# Patient Record
Sex: MALE | Race: WHITE | HISPANIC OR LATINO | Employment: FULL TIME | URBAN - METROPOLITAN AREA
[De-identification: names, ages, dates, MRNs, and addresses within clinical notes are randomized per-mention and may not be internally consistent; named-entity substitution may affect disease eponyms.]

---

## 2020-09-05 ENCOUNTER — APPOINTMENT (EMERGENCY)
Dept: CT IMAGING | Facility: HOSPITAL | Age: 60
End: 2020-09-05
Payer: COMMERCIAL

## 2020-09-05 ENCOUNTER — HOSPITAL ENCOUNTER (EMERGENCY)
Facility: HOSPITAL | Age: 60
Discharge: HOME | End: 2020-09-05
Attending: EMERGENCY MEDICINE | Admitting: EMERGENCY MEDICINE
Payer: COMMERCIAL

## 2020-09-05 VITALS
BODY MASS INDEX: 27.98 KG/M2 | RESPIRATION RATE: 18 BRPM | WEIGHT: 218 LBS | TEMPERATURE: 99 F | SYSTOLIC BLOOD PRESSURE: 122 MMHG | HEIGHT: 74 IN | DIASTOLIC BLOOD PRESSURE: 77 MMHG | OXYGEN SATURATION: 96 % | HEART RATE: 83 BPM

## 2020-09-05 DIAGNOSIS — N20.0 KIDNEY STONE: Primary | ICD-10-CM

## 2020-09-05 LAB
ALBUMIN SERPL BCP-MCNC: 4.5 G/DL (ref 3.4–4.8)
ALP SERPL-CCNC: 85.7 U/L (ref 10–129)
ALT SERPL W P-5'-P-CCNC: 27 U/L (ref 5–63)
ANION GAP SERPL CALCULATED.3IONS-SCNC: 13 MMOL/L (ref 4–13)
AST SERPL W P-5'-P-CCNC: 29 U/L (ref 15–41)
BACTERIA UR QL AUTO: ABNORMAL /HPF
BASOPHILS # BLD AUTO: 0.03 THOUSANDS/ΜL (ref 0–0.1)
BASOPHILS NFR BLD AUTO: 0 % (ref 0–1)
BILIRUB SERPL-MCNC: 0.81 MG/DL (ref 0.3–1.2)
BILIRUB UR QL STRIP: NEGATIVE
BUN SERPL-MCNC: 26 MG/DL (ref 6–20)
CALCIUM SERPL-MCNC: 9.8 MG/DL (ref 8.4–10.2)
CHLORIDE SERPL-SCNC: 102 MMOL/L (ref 96–108)
CLARITY UR: ABNORMAL
CO2 SERPL-SCNC: 21 MMOL/L (ref 22–33)
COLOR UR: YELLOW
CREAT SERPL-MCNC: 1.35 MG/DL (ref 0.5–1.2)
EOSINOPHIL # BLD AUTO: 0.04 THOUSAND/ΜL (ref 0–0.61)
EOSINOPHIL NFR BLD AUTO: 0 % (ref 0–6)
ERYTHROCYTE [DISTWIDTH] IN BLOOD BY AUTOMATED COUNT: 12.6 % (ref 11.6–15.1)
GFR SERPL CREATININE-BSD FRML MDRD: 57 ML/MIN/1.73SQ M
GLUCOSE SERPL-MCNC: 157 MG/DL (ref 65–140)
GLUCOSE UR STRIP-MCNC: ABNORMAL MG/DL
HCT VFR BLD AUTO: 49.2 % (ref 36.5–49.3)
HGB BLD-MCNC: 16.9 G/DL (ref 12–17)
HGB UR QL STRIP.AUTO: ABNORMAL
IMM GRANULOCYTES # BLD AUTO: 0.04 THOUSAND/UL (ref 0–0.2)
IMM GRANULOCYTES NFR BLD AUTO: 0 % (ref 0–2)
KETONES UR STRIP-MCNC: ABNORMAL MG/DL
LACTATE SERPL-SCNC: 1.3 MMOL/L (ref 0–2)
LEUKOCYTE ESTERASE UR QL STRIP: NEGATIVE
LYMPHOCYTES # BLD AUTO: 0.97 THOUSANDS/ΜL (ref 0.6–4.47)
LYMPHOCYTES NFR BLD AUTO: 8 % (ref 14–44)
MCH RBC QN AUTO: 29.9 PG (ref 26.8–34.3)
MCHC RBC AUTO-ENTMCNC: 34.3 G/DL (ref 31.4–37.4)
MCV RBC AUTO: 87 FL (ref 82–98)
MONOCYTES # BLD AUTO: 0.75 THOUSAND/ΜL (ref 0.17–1.22)
MONOCYTES NFR BLD AUTO: 7 % (ref 4–12)
NEUTROPHILS # BLD AUTO: 9.75 THOUSANDS/ΜL (ref 1.85–7.62)
NEUTS SEG NFR BLD AUTO: 85 % (ref 43–75)
NITRITE UR QL STRIP: NEGATIVE
NON-SQ EPI CELLS URNS QL MICRO: ABNORMAL /HPF
PH UR STRIP.AUTO: 5.5 [PH]
PLATELET # BLD AUTO: 164 THOUSANDS/UL (ref 149–390)
PMV BLD AUTO: 9.8 FL (ref 8.9–12.7)
POTASSIUM SERPL-SCNC: 4.1 MMOL/L (ref 3.5–5)
PROT SERPL-MCNC: 7.4 G/DL (ref 6.4–8.3)
PROT UR STRIP-MCNC: ABNORMAL MG/DL
RBC # BLD AUTO: 5.65 MILLION/UL (ref 3.88–5.62)
RBC #/AREA URNS AUTO: ABNORMAL /HPF
SODIUM SERPL-SCNC: 136 MMOL/L (ref 133–145)
SP GR UR STRIP.AUTO: 1.02 (ref 1–1.03)
UROBILINOGEN UR QL STRIP.AUTO: 0.2 E.U./DL
WBC # BLD AUTO: 11.58 THOUSAND/UL (ref 4.31–10.16)
WBC #/AREA URNS AUTO: ABNORMAL /HPF

## 2020-09-05 PROCEDURE — 85025 COMPLETE CBC W/AUTO DIFF WBC: CPT | Performed by: PHYSICIAN ASSISTANT

## 2020-09-05 PROCEDURE — 81001 URINALYSIS AUTO W/SCOPE: CPT | Performed by: EMERGENCY MEDICINE

## 2020-09-05 PROCEDURE — 83605 ASSAY OF LACTIC ACID: CPT | Performed by: PHYSICIAN ASSISTANT

## 2020-09-05 PROCEDURE — 80053 COMPREHEN METABOLIC PANEL: CPT | Performed by: PHYSICIAN ASSISTANT

## 2020-09-05 PROCEDURE — 99284 EMERGENCY DEPT VISIT MOD MDM: CPT

## 2020-09-05 PROCEDURE — 36415 COLL VENOUS BLD VENIPUNCTURE: CPT | Performed by: PHYSICIAN ASSISTANT

## 2020-09-05 PROCEDURE — 74176 CT ABD & PELVIS W/O CONTRAST: CPT

## 2020-09-05 PROCEDURE — G1004 CDSM NDSC: HCPCS

## 2020-09-05 PROCEDURE — 99285 EMERGENCY DEPT VISIT HI MDM: CPT | Performed by: PHYSICIAN ASSISTANT

## 2020-09-05 PROCEDURE — 96374 THER/PROPH/DIAG INJ IV PUSH: CPT

## 2020-09-05 RX ORDER — ALLOPURINOL 300 MG/1
TABLET ORAL
COMMUNITY
End: 2022-08-01 | Stop reason: SDUPTHER

## 2020-09-05 RX ORDER — ASPIRIN 81 MG/1
TABLET, CHEWABLE ORAL DAILY
COMMUNITY

## 2020-09-05 RX ORDER — EMPAGLIFLOZIN 25 MG/1
TABLET, FILM COATED ORAL
COMMUNITY

## 2020-09-05 RX ORDER — ATORVASTATIN CALCIUM 10 MG/1
TABLET, FILM COATED ORAL
COMMUNITY

## 2020-09-05 RX ORDER — LOSARTAN POTASSIUM 25 MG/1
TABLET ORAL
COMMUNITY

## 2020-09-05 RX ORDER — KETOROLAC TROMETHAMINE 30 MG/ML
15 INJECTION, SOLUTION INTRAMUSCULAR; INTRAVENOUS ONCE
Status: COMPLETED | OUTPATIENT
Start: 2020-09-05 | End: 2020-09-05

## 2020-09-05 RX ADMIN — KETOROLAC TROMETHAMINE 15 MG: 30 INJECTION, SOLUTION INTRAMUSCULAR at 21:05

## 2020-09-06 NOTE — ED PROVIDER NOTES
History  Chief Complaint   Patient presents with    Flank Pain     lower right flank sudden pain since 4pm, denies nausea and vomiting, pain comes and goes, denies dysuria, urgency or painful urinations  pt with hx of kidney stones     61-year-old male with history of prior kidney stones, hypertension, hyperlipidemia, doubt who drinks a lot of tea comes in today complaining of sudden onset right sided abdominal pain that began around 1600 has been intermittent since then  Reports that prior to arrival the pain became very severe  Denies any blood in his urine, back pain, fevers, nausea, vomiting, diarrhea          Prior to Admission Medications   Prescriptions Last Dose Informant Patient Reported? Taking? Cholecalciferol (Vitamin D3) 1 25 MG (29857 UT) TABS   Yes No   Sig: Daily   Empagliflozin (Jardiance) 25 MG TABS   Yes No   Sig: Jardiance 25 mg tablet   take 1 tablet by mouth once daily   allopurinol (ZYLOPRIM) 300 mg tablet   Yes No   Sig: allopurinol 300 mg tablet   take 1 tablet by mouth once daily   aspirin 81 mg chewable tablet   Yes No   Sig: Daily   atorvastatin (LIPITOR) 10 mg tablet   Yes No   Sig: atorvastatin 10 mg tablet   take 1 tablet by mouth at bedtime   losartan (COZAAR) 25 mg tablet   Yes No   Sig: losartan 25 mg tablet   take 1 tablet by mouth once daily   metFORMIN (GLUCOPHAGE) 1000 MG tablet   Yes No   Sig: metformin 1,000 mg tablet   take 1 tablet by mouth twice a day for diabetes      Facility-Administered Medications: None       Past Medical History:   Diagnosis Date    Pericarditis        Past Surgical History:   Procedure Laterality Date    RETINAL DETACHMENT SURGERY Left        History reviewed  No pertinent family history  I have reviewed and agree with the history as documented      E-Cigarette/Vaping    E-Cigarette Use Never User      E-Cigarette/Vaping Substances     Social History     Tobacco Use    Smoking status: Never Smoker    Smokeless tobacco: Never Used Substance Use Topics    Alcohol use: Not Currently    Drug use: Never       Review of Systems   Constitutional: Negative for fever  HENT: Negative for facial swelling  Eyes: Negative for redness  Respiratory: Negative for shortness of breath  Cardiovascular: Negative for chest pain  Gastrointestinal: Positive for abdominal pain  Negative for diarrhea, nausea and vomiting  Genitourinary: Negative for dysuria and hematuria  Musculoskeletal: Negative for back pain  Skin: Negative for rash  Neurological: Negative for headaches  Psychiatric/Behavioral: Negative for confusion  Physical Exam  Physical Exam  Constitutional:       General: He is in acute distress (Moderate distress)  Appearance: He is well-developed  HENT:      Head: Normocephalic and atraumatic  Eyes:      Conjunctiva/sclera: Conjunctivae normal    Neck:      Musculoskeletal: Normal range of motion  Cardiovascular:      Rate and Rhythm: Normal rate and regular rhythm  Pulmonary:      Effort: Pulmonary effort is normal       Breath sounds: Normal breath sounds  No wheezing  Abdominal:      General: Abdomen is flat  Bowel sounds are normal       Palpations: Abdomen is soft  Musculoskeletal: Normal range of motion  Skin:     General: Skin is warm and dry  Neurological:      Mental Status: He is alert and oriented to person, place, and time     Psychiatric:         Behavior: Behavior normal          Vital Signs  ED Triage Vitals [09/05/20 2025]   Temperature Pulse Respirations Blood Pressure SpO2   99 °F (37 2 °C) 89 20 140/87 99 %      Temp Source Heart Rate Source Patient Position - Orthostatic VS BP Location FiO2 (%)   Tympanic Monitor Lying Right arm --      Pain Score       Worst Possible Pain           Vitals:    09/05/20 2025   BP: 140/87   Pulse: 89   Patient Position - Orthostatic VS: Lying         Visual Acuity      ED Medications  Medications   ketorolac (TORADOL) injection 15 mg (has no administration in time range)       Diagnostic Studies  Results Reviewed     Procedure Component Value Units Date/Time    CBC and differential [[id number]]  (Abnormal) Collected:  09/05/20 2042    Lab Status:  Final result Specimen:  Blood from Arm, Left Updated:  09/05/20 2049     WBC 11 58 Thousand/uL      RBC 5 65 Million/uL      Hemoglobin 16 9 g/dL      Hematocrit 49 2 %      MCV 87 fL      MCH 29 9 pg      MCHC 34 3 g/dL      RDW 12 6 %      MPV 9 8 fL      Platelets 955 Thousands/uL      Neutrophils Relative 85 %      Immat GRANS % 0 %      Lymphocytes Relative 8 %      Monocytes Relative 7 %      Eosinophils Relative 0 %      Basophils Relative 0 %      Neutrophils Absolute 9 75 Thousands/µL      Immature Grans Absolute 0 04 Thousand/uL      Lymphocytes Absolute 0 97 Thousands/µL      Monocytes Absolute 0 75 Thousand/µL      Eosinophils Absolute 0 04 Thousand/µL      Basophils Absolute 0 03 Thousands/µL     Lactic acid [[id number]] Collected:  09/05/20 2042    Lab Status: In process Specimen:  Blood from Arm, Left Updated:  09/05/20 2046    Comprehensive metabolic panel [[id number]] Collected:  09/05/20 2041    Lab Status: In process Specimen:  Blood from Arm, Left Updated:  09/05/20 2046                 CT renal stone study abdomen pelvis without contrast    (Results Pending)              Procedures  Procedures         ED Course  ED Course as of Sep 05 2051   Sat Sep 05, 2020   2047 Patient care transitioned to Dr James Pino pending labs and CT          US AUDIT      Most Recent Value   Initial Alcohol Screen: US AUDIT-C    1  How often do you have a drink containing alcohol?  0 Filed at: 09/05/2020 2042   2  How many drinks containing alcohol do you have on a typical day you are drinking? 0 Filed at: 09/05/2020 2042   3a  Male UNDER 65: How often do you have five or more drinks on one occasion?   0 Filed at: 09/05/2020 2042   Audit-C Score  0 Filed at: 09/05/2020 2042                  VANESSA/DAST-10      Most Recent Value   How many times in the past year have you    Used an illegal drug or used a prescription medication for non-medical reasons? Never Filed at: 09/05/2020 2042                                MDM      Disposition  Final diagnoses:   None     ED Disposition     None      Follow-up Information    None         Patient's Medications   Discharge Prescriptions    No medications on file     No discharge procedures on file      PDMP Review     None          ED Provider  Electronically Signed by           Jeremias Rubio PA-C  09/05/20 2051

## 2021-03-10 DIAGNOSIS — Z23 ENCOUNTER FOR IMMUNIZATION: ICD-10-CM

## 2021-03-28 ENCOUNTER — IMMUNIZATIONS (OUTPATIENT)
Dept: FAMILY MEDICINE CLINIC | Facility: HOSPITAL | Age: 61
End: 2021-03-28

## 2021-03-28 DIAGNOSIS — Z23 ENCOUNTER FOR IMMUNIZATION: Primary | ICD-10-CM

## 2021-03-28 PROCEDURE — 0001A SARS-COV-2 / COVID-19 MRNA VACCINE (PFIZER-BIONTECH) 30 MCG: CPT

## 2021-03-28 PROCEDURE — 91300 SARS-COV-2 / COVID-19 MRNA VACCINE (PFIZER-BIONTECH) 30 MCG: CPT

## 2021-04-18 ENCOUNTER — IMMUNIZATIONS (OUTPATIENT)
Dept: FAMILY MEDICINE CLINIC | Facility: HOSPITAL | Age: 61
End: 2021-04-18

## 2021-04-18 DIAGNOSIS — Z23 ENCOUNTER FOR IMMUNIZATION: Primary | ICD-10-CM

## 2021-04-18 PROCEDURE — 0002A SARS-COV-2 / COVID-19 MRNA VACCINE (PFIZER-BIONTECH) 30 MCG: CPT

## 2021-04-18 PROCEDURE — 91300 SARS-COV-2 / COVID-19 MRNA VACCINE (PFIZER-BIONTECH) 30 MCG: CPT

## 2021-10-15 ENCOUNTER — HOSPITAL ENCOUNTER (OUTPATIENT)
Dept: ULTRASOUND IMAGING | Facility: HOSPITAL | Age: 61
Discharge: HOME | End: 2021-10-15
Attending: SPECIALIST
Payer: COMMERCIAL

## 2021-10-15 ENCOUNTER — HOSPITAL ENCOUNTER (OUTPATIENT)
Dept: RADIOLOGY | Facility: HOSPITAL | Age: 61
Discharge: HOME | End: 2021-10-15
Attending: SPECIALIST
Payer: COMMERCIAL

## 2021-10-15 DIAGNOSIS — N20.0 URIC ACID NEPHROLITHIASIS: ICD-10-CM

## 2021-10-15 DIAGNOSIS — N20.0 CALCULUS, RENAL: ICD-10-CM

## 2021-10-15 PROCEDURE — 74018 RADEX ABDOMEN 1 VIEW: CPT

## 2021-10-15 PROCEDURE — 76770 US EXAM ABDO BACK WALL COMP: CPT

## 2022-08-01 DIAGNOSIS — Z87.39 HISTORY OF GOUT: Primary | ICD-10-CM

## 2022-08-01 DIAGNOSIS — E11.9 TYPE 2 DIABETES MELLITUS WITHOUT COMPLICATION, WITHOUT LONG-TERM CURRENT USE OF INSULIN (HCC): ICD-10-CM

## 2022-08-02 RX ORDER — ALLOPURINOL 300 MG/1
300 TABLET ORAL DAILY
Qty: 90 TABLET | Refills: 0 | Status: SHIPPED | OUTPATIENT
Start: 2022-08-02

## 2022-08-16 ENCOUNTER — TELEPHONE (OUTPATIENT)
Dept: FAMILY MEDICINE CLINIC | Facility: CLINIC | Age: 62
End: 2022-08-16

## 2022-08-16 NOTE — TELEPHONE ENCOUNTER
Estrella Munoz [id number]  Rebellious, rebellious rebels is 14 milligrams  Phone number [phone]            Hernandez 14mg 1 POQD    #30 w/2    [employer]

## 2022-08-17 DIAGNOSIS — E11.9 TYPE 2 DIABETES MELLITUS WITHOUT COMPLICATION, WITHOUT LONG-TERM CURRENT USE OF INSULIN (HCC): ICD-10-CM

## 2022-08-17 DIAGNOSIS — E11.9 TYPE 2 DIABETES MELLITUS WITHOUT COMPLICATION, WITHOUT LONG-TERM CURRENT USE OF INSULIN (HCC): Primary | ICD-10-CM

## 2022-08-17 RX ORDER — ORAL SEMAGLUTIDE 14 MG/1
1 TABLET ORAL DAILY
Qty: 30 TABLET | Refills: 2 | Status: SHIPPED | OUTPATIENT
Start: 2022-08-17

## 2022-08-17 RX ORDER — ORAL SEMAGLUTIDE 14 MG/1
1 TABLET ORAL DAILY
Qty: 30 TABLET | Refills: 2 | Status: SHIPPED | OUTPATIENT
Start: 2022-08-17 | End: 2022-08-17 | Stop reason: SDUPTHER

## 2022-08-17 RX ORDER — ORAL SEMAGLUTIDE 14 MG/1
1 TABLET ORAL DAILY
COMMUNITY
Start: 2022-07-16 | End: 2022-08-17 | Stop reason: SDUPTHER

## 2022-09-23 LAB — HBA1C MFR BLD HPLC: 7.2 %

## 2022-09-29 ENCOUNTER — OFFICE VISIT (OUTPATIENT)
Dept: FAMILY MEDICINE CLINIC | Facility: CLINIC | Age: 62
End: 2022-09-29
Payer: COMMERCIAL

## 2022-09-29 VITALS
WEIGHT: 211 LBS | TEMPERATURE: 98.2 F | DIASTOLIC BLOOD PRESSURE: 70 MMHG | BODY MASS INDEX: 27.08 KG/M2 | SYSTOLIC BLOOD PRESSURE: 144 MMHG | HEIGHT: 74 IN | HEART RATE: 85 BPM | OXYGEN SATURATION: 97 %

## 2022-09-29 DIAGNOSIS — E11.9 TYPE 2 DIABETES MELLITUS WITHOUT COMPLICATION, WITHOUT LONG-TERM CURRENT USE OF INSULIN (HCC): Primary | ICD-10-CM

## 2022-09-29 DIAGNOSIS — E78.2 MIXED HYPERLIPIDEMIA: ICD-10-CM

## 2022-09-29 DIAGNOSIS — I10 HTN (HYPERTENSION), BENIGN: ICD-10-CM

## 2022-09-29 DIAGNOSIS — Z87.39 HISTORY OF GOUT: ICD-10-CM

## 2022-09-29 PROBLEM — N20.9 UROLITHIASIS: Status: ACTIVE | Noted: 2022-09-29

## 2022-09-29 PROCEDURE — 4010F ACE/ARB THERAPY RXD/TAKEN: CPT

## 2022-09-29 PROCEDURE — 99214 OFFICE O/P EST MOD 30 MIN: CPT

## 2022-09-29 RX ORDER — LOSARTAN POTASSIUM 25 MG/1
25 TABLET ORAL DAILY
Qty: 30 TABLET | Refills: 3 | Status: SHIPPED | OUTPATIENT
Start: 2022-09-29

## 2022-09-29 RX ORDER — ROSUVASTATIN CALCIUM 10 MG/1
10 TABLET, COATED ORAL DAILY
COMMUNITY
Start: 2022-08-28 | End: 2022-09-29 | Stop reason: SDUPTHER

## 2022-09-29 RX ORDER — ROSUVASTATIN CALCIUM 10 MG/1
10 TABLET, COATED ORAL DAILY
Qty: 30 TABLET | Refills: 3 | Status: SHIPPED | OUTPATIENT
Start: 2022-09-29

## 2022-09-29 NOTE — PROGRESS NOTES
Assessment/Plan:         Problem List Items Addressed This Visit        Endocrine    Type 2 diabetes mellitus without complication, without long-term current use of insulin ([address] ) - Primary     Under control  Continue current medication  We will re-evaluate at next office visit  No results found for: HGBA1C            Cardiovascular and Mediastinum    HTN (hypertension), benign     Under control  Continue current medication  We will re-evaluate at next office visit  Other Visit Diagnoses     BMI 27 0-27 9,adult                Subjective:      Patient ID: Dary Orellana is a 58 y o  male  Patient here for review of chronic medical problems and review of the labs and imaging if it is applicable  Currently has no specific complaints other than mentioned in the review of systems  Denies chest pain, SOB, cough, abdominal pain, nausea, vomiting, fever, chills, lightheadedness, dizziness,headache, tingling or numbness  No bowel or bladder problem  The following portions of the patient's history were reviewed and updated as appropriate:   Past Medical History:  He has a past medical history of Allergic rhinitis, Carotid stenosis, Colon polyps, Degenerative joint disease, Diabetes mellitus ([facility] [address] ), Diverticulitis, Glaucoma, Gout, Hyperlipidemia, Kidney stones, Obesity, and Pericarditis  ,  _______________________________________________________________________  Medical Problems:  does not have any pertinent problems on file ,  _______________________________________________________________________  Past Surgical History:   has a past surgical history that includes Retinal detachment surgery (Left); Colonoscopy; Cardiac surgery; and Cataract extraction (Bilateral)  ,  _______________________________________________________________________  Family History:  family history includes Arthritis in his mother; Coronary artery disease in his brother; Diabetes in his mother ,  _______________________________________________________________________  Social History:   reports that he has never smoked  He has never used smokeless tobacco  He reports previous alcohol use  He reports that he does not use drugs  ,  _______________________________________________________________________  Allergies:  has No Known Allergies     _______________________________________________________________________  Current Outpatient Medications   Medication Sig Dispense Refill    allopurinol (ZYLOPRIM) 300 mg tablet Take 1 tablet (300 mg total) by mouth daily 90 tablet 0    aspirin 81 mg chewable tablet Daily      Cholecalciferol (Vitamin D3) 1 25 MG (39872 UT) TABS Daily      Empagliflozin (Jardiance) 25 MG TABS Jardiance 25 mg tablet   take 1 tablet by mouth once daily      losartan (COZAAR) 25 mg tablet losartan 25 mg tablet   take 1 tablet by mouth once daily      metFORMIN (GLUCOPHAGE) 1000 MG tablet Take 1 tablet (1,000 mg total) by mouth 2 (two) times a day with meals 180 tablet 0    rosuvastatin (CRESTOR) 10 MG tablet Take 10 mg by mouth daily      Rybelsus 14 MG TABS Take 1 tablet by mouth daily 30 tablet 2    atorvastatin (LIPITOR) 10 mg tablet atorvastatin 10 mg tablet   take 1 tablet by mouth at bedtime (Patient not taking: Reported on 9/29/2022)       No current facility-administered medications for this visit      _______________________________________________________________________  Review of Systems   Constitutional: Negative for chills, fatigue and fever  HENT: Negative for congestion, ear pain, rhinorrhea, sneezing and sore throat  Eyes: Positive for visual disturbance  Negative for pain, discharge, redness and itching  Respiratory: Negative for cough, chest tightness and shortness of breath  Cardiovascular: Negative for chest pain, palpitations and leg swelling  Gastrointestinal: Negative for abdominal pain, blood in stool, diarrhea, nausea and vomiting  Endocrine: Negative for cold intolerance, heat intolerance, polydipsia, polyphagia and polyuria  Genitourinary: Negative for dysuria, frequency, hematuria and urgency  Musculoskeletal: Negative for arthralgias, back pain and myalgias  Skin: Negative for color change and rash  Neurological: Negative for dizziness, weakness, light-headedness, numbness and headaches  Hematological: Does not bruise/bleed easily  Psychiatric/Behavioral: Negative for agitation, behavioral problems and confusion  Objective:  Vitals:    09/29/22 1401   BP: 144/70   BP Location: Left arm   Patient Position: Sitting   Pulse: 85   Temp: 98 2 °F (36 8 °C)   TempSrc: Tympanic   SpO2: 97%   Weight: 95 7 kg (211 lb)   Height: 6' 2" (1 88 m)     Body mass index is 27 09 kg/m²  Physical Exam  Vitals and nursing note reviewed  Constitutional:       General: He is not in acute distress  Appearance: Normal appearance  He is not ill-appearing, toxic-appearing or diaphoretic  HENT:      Head: Normocephalic and atraumatic  Right Ear: Tympanic membrane normal       Left Ear: Tympanic membrane normal       Nose: Nose normal  No congestion  Mouth/Throat:      Mouth: Mucous membranes are moist    Eyes:      General: No scleral icterus  Right eye: No discharge  Left eye: No discharge  Extraocular Movements: Extraocular movements intact  Conjunctiva/sclera: Conjunctivae normal       Pupils: Pupils are equal, round, and reactive to light  Cardiovascular:      Rate and Rhythm: Normal rate and regular rhythm  Pulses: Normal pulses  no weak pulses          Dorsalis pedis pulses are 2+ on the right side and 2+ on the left side  Heart sounds: Normal heart sounds  No murmur heard  No gallop  Pulmonary:      Effort: Pulmonary effort is normal  No respiratory distress  Breath sounds: Normal breath sounds  No wheezing, rhonchi or rales  Abdominal:      General: Abdomen is flat  Bowel sounds are normal  There is no distension  Palpations: Abdomen is soft  Tenderness: There is no abdominal tenderness  There is no guarding  Musculoskeletal:         General: No swelling or tenderness  Normal range of motion  Cervical back: Normal range of motion and neck supple  No rigidity  Feet:      Right foot:      Skin integrity: Callus and dry skin present  No ulcer, skin breakdown, erythema or warmth  Left foot:      Skin integrity: Callus and dry skin present  No ulcer, skin breakdown, erythema or warmth  Lymphadenopathy:      Cervical: No cervical adenopathy  Skin:     General: Skin is warm  Capillary Refill: Capillary refill takes 2 to 3 seconds  Coloration: Skin is not jaundiced  Findings: No bruising or rash  Neurological:      General: No focal deficit present  Mental Status: He is alert and oriented to person, place, and time  Mental status is at baseline  Gait: Gait normal    Psychiatric:         Mood and Affect: Mood normal        BMI Counseling: Body mass index is 27 09 kg/m²  The BMI is above normal  Nutrition recommendations include decreasing portion sizes, decreasing fast food intake, limiting drinks that contain sugar, moderation in carbohydrate intake, increasing intake of lean protein and reducing intake of saturated and trans fat  Exercise recommendations include vigorous physical activity 75 minutes/week  No pharmacotherapy was ordered  Rationale for BMI follow-up plan is due to patient being overweight or obese  Diabetic Foot Exam    Patient's shoes and socks removed  Right Foot/Ankle   Right Foot Inspection  Skin Exam: skin normal, skin intact, dry skin, callus and callus  No warmth, no erythema, no maceration, no abnormal color, no pre-ulcer and no ulcer  Toe Exam: ROM and strength within normal limits   No swelling and erythema    Sensory   Monofilament testing: intact    Vascular  Capillary refills: < 3 seconds  The right DP pulse is 2+  Left Foot/Ankle  Left Foot Inspection  Skin Exam: skin normal, skin intact, dry skin and callus  No warmth, no erythema, no maceration, normal color, no pre-ulcer and no ulcer  Toe Exam: ROM and strength within normal limits  No swelling and no erythema  Sensory   Monofilament testing: intact    Vascular  Capillary refills: < 3 seconds  The left DP pulse is 2+       Assign Risk Category  No deformity present  No loss of protective sensation  No weak pulses  Risk: 0

## 2022-09-29 NOTE — ASSESSMENT & PLAN NOTE
Keron Ms. Blankenship Yanira,  Your results came back and urine analysis showed some ketones which means you were fasting and need to eat.  There is trace leukocyte esterase but the nitrites were negative which does suggests you do not have a urine infection. Microscope evaluation showed there were no red blood cells or white cells which also goes along with likely not having a urine infection.  You did have some bacteria attached to your skin cells and mucus that fell into the urine likely from collection contamination.  I did set up a urine culture to make sure you are not growing any bacteria.  We will make further recommendations once those results are in   For now if feeling well and have not started the antibiotic would avoid doing so as it does not seem necessary.   If you have any further concerns please do not hesitate to contact us by message, phone or making an appointment.  Have a good day   Dr Brandon WEINSTEIN  Under control  Continue current medication  We will re-evaluate at next office visit

## 2022-10-03 ENCOUNTER — TELEPHONE (OUTPATIENT)
Dept: FAMILY MEDICINE CLINIC | Facility: CLINIC | Age: 62
End: 2022-10-03

## 2022-11-05 ENCOUNTER — APPOINTMENT (OUTPATIENT)
Dept: LAB | Facility: MEDICAL CENTER | Age: 62
End: 2022-11-05

## 2022-11-05 DIAGNOSIS — R35.1 NOCTURIA: ICD-10-CM

## 2022-11-05 LAB — PSA SERPL-MCNC: 0.8 NG/ML (ref 0–4)

## 2022-11-09 DIAGNOSIS — E11.9 TYPE 2 DIABETES MELLITUS WITHOUT COMPLICATION, WITHOUT LONG-TERM CURRENT USE OF INSULIN (HCC): ICD-10-CM

## 2022-11-09 DIAGNOSIS — Z87.39 HISTORY OF GOUT: ICD-10-CM

## 2022-11-09 RX ORDER — ALLOPURINOL 300 MG/1
300 TABLET ORAL DAILY
Qty: 90 TABLET | Refills: 0 | Status: SHIPPED | OUTPATIENT
Start: 2022-11-09

## 2022-11-09 NOTE — TELEPHONE ENCOUNTER
Pharmacy fax for :        Allopurinol 300mg 1 poqd #90    Metformin 1,000mg 1 poBID #180        Sent scripts to [facility]

## 2022-11-10 DIAGNOSIS — R07.9 CHEST PAIN, UNSPECIFIED TYPE: Primary | ICD-10-CM

## 2022-12-15 DIAGNOSIS — E11.9 TYPE 2 DIABETES MELLITUS WITHOUT COMPLICATION, WITHOUT LONG-TERM CURRENT USE OF INSULIN (HCC): ICD-10-CM

## 2022-12-15 RX ORDER — ORAL SEMAGLUTIDE 14 MG/1
1 TABLET ORAL DAILY
Qty: 30 TABLET | Refills: 2 | Status: SHIPPED | OUTPATIENT
Start: 2022-12-15

## 2023-01-23 DIAGNOSIS — Z87.39 HISTORY OF GOUT: ICD-10-CM

## 2023-01-23 DIAGNOSIS — E11.9 TYPE 2 DIABETES MELLITUS WITHOUT COMPLICATION, WITHOUT LONG-TERM CURRENT USE OF INSULIN (HCC): ICD-10-CM

## 2023-01-23 DIAGNOSIS — I10 HTN (HYPERTENSION), BENIGN: ICD-10-CM

## 2023-01-23 DIAGNOSIS — E78.2 MIXED HYPERLIPIDEMIA: ICD-10-CM

## 2023-01-23 RX ORDER — LOSARTAN POTASSIUM 25 MG/1
25 TABLET ORAL DAILY
Qty: 30 TABLET | Refills: 3 | Status: SHIPPED | OUTPATIENT
Start: 2023-01-23

## 2023-01-23 RX ORDER — ALLOPURINOL 300 MG/1
300 TABLET ORAL DAILY
Qty: 90 TABLET | Refills: 0 | Status: SHIPPED | OUTPATIENT
Start: 2023-01-23

## 2023-01-23 RX ORDER — ROSUVASTATIN CALCIUM 10 MG/1
10 TABLET, COATED ORAL DAILY
Qty: 30 TABLET | Refills: 3 | Status: SHIPPED | OUTPATIENT
Start: 2023-01-23

## 2023-01-26 ENCOUNTER — CONSULT (OUTPATIENT)
Dept: CARDIOLOGY CLINIC | Facility: CLINIC | Age: 63
End: 2023-01-26

## 2023-01-26 VITALS
WEIGHT: 210 LBS | HEIGHT: 74 IN | DIASTOLIC BLOOD PRESSURE: 82 MMHG | TEMPERATURE: 99 F | OXYGEN SATURATION: 97 % | SYSTOLIC BLOOD PRESSURE: 116 MMHG | HEART RATE: 104 BPM | BODY MASS INDEX: 26.95 KG/M2

## 2023-01-26 DIAGNOSIS — R07.9 CHEST PAIN, UNSPECIFIED TYPE: ICD-10-CM

## 2023-01-26 DIAGNOSIS — Z82.49 FAMILY HISTORY OF EARLY CAD: Primary | ICD-10-CM

## 2023-01-26 DIAGNOSIS — Z98.890 HISTORY OF PERICARDIECTOMY: ICD-10-CM

## 2023-01-26 DIAGNOSIS — N18.2 TYPE 2 DIABETES MELLITUS WITH STAGE 2 CHRONIC KIDNEY DISEASE, WITHOUT LONG-TERM CURRENT USE OF INSULIN (HCC): ICD-10-CM

## 2023-01-26 DIAGNOSIS — E11.22 TYPE 2 DIABETES MELLITUS WITH STAGE 2 CHRONIC KIDNEY DISEASE, WITHOUT LONG-TERM CURRENT USE OF INSULIN (HCC): ICD-10-CM

## 2023-01-26 DIAGNOSIS — N18.2 STAGE 2 CHRONIC KIDNEY DISEASE: ICD-10-CM

## 2023-01-26 NOTE — PROGRESS NOTES
[address] [facility] [address] [address]  Phone#  [phone]  Fax#  [phone]                                               Cardiology Office Consult  Aishwarya Sharma, 58 y o  male  YOB: 1960  MRN: [id number] Encounter: [id number]      PCP - Walter Tse MD  Referring Provider - Fantasma Riley MD    Chief Complaint   Patient presents with   • Consult       Assessment  Family history of early CAD  Brother was otherwise healthy, and suddenly needed CABG at 62  Father  suddenly of presumed heart attack  Diabetes Mellitus Type 2  A1c 7 2% (22)  CKD-2  Cr 1 35 in   Hyperlipidemia  s/p pericardiectomy (@[facility])  For infected pericardium    Plan  Family history of early CAD  Overview  His father  suddenly of a presumed heart attack, and his brother recently needed CABG after otherwise seemingly being healthy and as a result this is got his wife very concerned  No major chest pain/pressure shortness of breath  ECG today: sinus tachycardia, possible LAD enlargement, poor R-wave progression - cannot r/o old anterior infarct  Impression  He does have multiple risk factors related to diabetes, family history of early CAD, but otherwise seems to be doing well  Plan  Recommended risk stratification with exercise ECG stress test  ECG has possibilities of old anterior infarct, and he reports having had a prior cardiac surgery to remove his entire pericardium -> check echocardiogram  Optimize risk factors  Avoid smoking, alcohol  Optimize diabetes control  Counseled regarding symptoms that need to be monitored for      Results for orders placed or performed in visit on 23   POCT ECG    Impression    Sinus tachycardia  Possible left atrial enlargement  Poor R wave progression = cannot rule out old anterior infarct       Orders Placed This Encounter   Procedures   • Lipid Panel with Direct LDL reflex   • Basic metabolic panel   • Stress test only, exercise   • POCT ECG   • Echo complete w/ contrast if indicated     Return in about 3 months (around 2023), or if symptoms worsen or fail to improve  History of Present Illness   58 y o  male , who works as  at [employer], comes in as a new patient for consultation regarding cardiac risk stratification due to family history of early CAD  He reports no active or recent symptoms of chest pain or shortness of breath  No complaints of palpitations, dizziness or lightheadedness, near-syncope or syncope  No prior history of CAD or heart failure  He remains quite active with working and reports being able to go up 2 flights of stairs without any major symptoms  Recently his brother was also quite healthy suddenly needed bypass surgery, and this is got his wife very concerned  Additionally his father also has a history of dying suddenly in his 62s, and as a result he is here for cardiac risk stratification    He does report to me that he had a cardiac surgery at the age of 5, which involved stripping the entire covering around the heart due to an infection in that area  This was reportedly done at [facility] around [address] [facility]  He had limited follow-up after that, but has not seen a cardiologist in many decades  Family history  Father -  in sleep at 72, of presumed heart attack  Mother - went for pre-op for knee replacement, found to have cardiomyopathy and very low EF    Paternal uncle -  at age 28 of "massive heart attack"  Brother - CABG at age 62    Historical Information   Past Medical History:   Diagnosis Date   • Allergic rhinitis    • Carotid stenosis    • Colon polyps    • Degenerative joint disease    • Diabetes mellitus ([address] )    • Diverticulitis    • Glaucoma    • Gout    • Hyperlipidemia    • Kidney stones    • Obesity    • Pericarditis      Past Surgical History:   Procedure Laterality Date   • CARDIAC SURGERY     • CATARACT EXTRACTION Bilateral     Left eye 4/30/2021 and right eye 5/3/2021   • COLONOSCOPY     • RETINAL DETACHMENT SURGERY Left      Family History   Problem Relation Age of Onset   • Diabetes Mother    • Arthritis Mother         Rheumatoid   • Coronary artery disease Brother      Current Outpatient Medications on File Prior to Visit   Medication Sig Dispense Refill   • allopurinol (ZYLOPRIM) 300 mg tablet Take 1 tablet (300 mg total) by mouth daily 90 tablet 0   • aspirin 81 mg chewable tablet Daily     • Cholecalciferol (Vitamin D3) 1 25 MG (26040 UT) TABS Daily     • Empagliflozin (Jardiance) 25 MG TABS Take 1 tablet (25 mg total) by mouth daily after breakfast 30 tablet 3   • losartan (COZAAR) 25 mg tablet Take 1 tablet (25 mg total) by mouth daily 30 tablet 3   • metFORMIN (GLUCOPHAGE) 1000 MG tablet Take 1 tablet (1,000 mg total) by mouth 2 (two) times a day with meals 180 tablet 0   • rosuvastatin (CRESTOR) 10 MG tablet Take 1 tablet (10 mg total) by mouth daily 30 tablet 3   • Rybelsus 14 MG TABS Take 1 tablet by mouth daily 30 tablet 2     No current facility-administered medications on file prior to visit       No Known Allergies  Social History     Socioeconomic History   • Marital status: /Civil Union     Spouse name: None   • Number of children: None   • Years of education: None   • Highest education level: None   Occupational History   • None   Tobacco Use   • Smoking status: Never   • Smokeless tobacco: Never   Vaping Use   • Vaping Use: Never used   Substance and Sexual Activity   • Alcohol use: Not Currently   • Drug use: Never   • Sexual activity: None   Other Topics Concern   • None   Social History Narrative   • None     Social Determinants of Health     Financial Resource Strain: Not on file   Food Insecurity: Not on file   Transportation Needs: Not on file   Physical Activity: Not on file   Stress: Not on file   Social Connections: Not on file   Intimate Partner Violence: Not on file Housing Stability: Not on file        Review of Systems   All other systems reviewed and are negative  Vitals:  Vitals:    01/26/23 1304   BP: 116/82   BP Location: Right arm   Patient Position: Sitting   Cuff Size: Standard   Pulse: 104   Temp: 99 °F (37 2 °C)   SpO2: 97%   Weight: 95 3 kg (210 lb)   Height: 6' 2" (1 88 m)     BMI - Body mass index is 26 96 kg/m²  Wt Readings from Last 7 Encounters:   01/26/23 95 3 kg (210 lb)   09/29/22 95 7 kg (211 lb)   09/05/20 98 9 kg (218 lb)       Physical Exam  Vitals and nursing note reviewed  Constitutional:       General: He is not in acute distress  Appearance: He is well-developed  He is not ill-appearing or diaphoretic  HENT:      Head: Normocephalic and atraumatic  Nose: No congestion  Eyes:      General: No scleral icterus  Conjunctiva/sclera: Conjunctivae normal    Neck:      Vascular: No carotid bruit or JVD  Cardiovascular:      Rate and Rhythm: Normal rate and regular rhythm  Heart sounds: Normal heart sounds  No murmur heard  No friction rub  No gallop  Pulmonary:      Effort: Pulmonary effort is normal  No respiratory distress  Breath sounds: Normal breath sounds  No wheezing or rales  Chest:      Chest wall: No tenderness  Abdominal:      General: There is no distension  Palpations: Abdomen is soft  Tenderness: There is no abdominal tenderness  Musculoskeletal:         General: No swelling, tenderness or deformity  Cervical back: Neck supple  No muscular tenderness  Right lower leg: No edema  Left lower leg: No edema  Skin:     General: Skin is warm  Neurological:      General: No focal deficit present  Mental Status: He is alert and oriented to person, place, and time  Mental status is at baseline  Psychiatric:         Mood and Affect: Mood normal          Behavior: Behavior normal          Thought Content:  Thought content normal            Labs:  CBC:   Lab Results Component Value Date    WBC 11 58 (H) 09/05/2020    RBC 5 65 (H) 09/05/2020    HGB 16 9 09/05/2020    HCT 49 2 09/05/2020    MCV 87 09/05/2020     09/05/2020    RDW 12 6 09/05/2020       CMP:   Lab Results   Component Value Date    K 4 1 09/05/2020     09/05/2020    CO2 21 (L) 09/05/2020    BUN 26 (H) 09/05/2020    CREATININE 1 35 (H) 09/05/2020    EGFR 57 09/05/2020    CALCIUM 9 8 09/05/2020    AST 29 09/05/2020    ALT 27 09/05/2020    ALKPHOS 85 7 09/05/2020       Magnesium:  No results found for: MG    Lipid Profile:   No results found for: CHOL, HDL, TRIG, LDLCALC    Thyroid Studies: No results found for: KJT6ZRJVBOOR, T3FREE, FREET4, R1SDBBB, Y4VWLQQ    A1c:  No components found for: HGA1C    INR:  No results found for: ZGX7    Imaging: No results found  Cardiac testing:   No results found for this or any previous visit  No results found for this or any previous visit  No results found for this or any previous visit  No results found for this or any previous visit  XR abdomen 1 view kub  Narrative: Clinical history: Renal calculus  TECHNIQUE: AP supine images of the abdomen and pelvis were obtained  FINDINGS: There is a large amount of formed stool throughout the entire colon  No evidence of bowel obstruction  Lung bases are clear  Skeletal structures show mild degenerative disc disease  Impression: 1  Large amount of stool throughout the colon  No evidence of bowel obstruction  Workstation performed: [id number]  US kidney and bladder  Narrative: RENAL ULTRASOUND    INDICATION:   N20 0: Calculus of kidney  COMPARISON: Prior studies, most recent is a CT scan dated September 5, 2020  TECHNIQUE:   Ultrasound of the retroperitoneum was performed with a curvilinear transducer utilizing volumetric sweeps and still imaging techniques  FINDINGS:    KIDNEYS:  Symmetric and normal size  Right kidney:  12 9 cm  Left kidney:  12 4 cm      Right kidney  Normal echogenicity and contour  No suspicious masses detected  No hydronephrosis  No shadowing calculi  No perinephric fluid collections  Left kidney  Normal echogenicity and contour  No suspicious masses detected  No hydronephrosis  No shadowing calculi  No perinephric fluid collections  URETERS:  Nonvisualized  BLADDER:   Normally distended  Probable mild bladder wall thickening  Bilateral ureteral jets detected  Cursory images of the liver show hepatic steatosis  Impression: 1  Negative for echogenic calculi  No hydronephrosis  2   Probable mild bladder wall thickening likely on the basis of chronic outlet obstruction  3   Hepatic steatosis incompletely imaged       Workstation performed: [id number]

## 2023-02-08 ENCOUNTER — APPOINTMENT (OUTPATIENT)
Dept: LAB | Facility: MEDICAL CENTER | Age: 63
End: 2023-02-08

## 2023-02-08 DIAGNOSIS — E11.22 TYPE 2 DIABETES MELLITUS WITH STAGE 2 CHRONIC KIDNEY DISEASE, WITHOUT LONG-TERM CURRENT USE OF INSULIN (HCC): ICD-10-CM

## 2023-02-08 DIAGNOSIS — N18.2 TYPE 2 DIABETES MELLITUS WITH STAGE 2 CHRONIC KIDNEY DISEASE, WITHOUT LONG-TERM CURRENT USE OF INSULIN (HCC): ICD-10-CM

## 2023-02-08 DIAGNOSIS — E11.9 TYPE 2 DIABETES MELLITUS WITHOUT COMPLICATION, WITHOUT LONG-TERM CURRENT USE OF INSULIN (HCC): ICD-10-CM

## 2023-02-08 DIAGNOSIS — Z82.49 FAMILY HISTORY OF EARLY CAD: ICD-10-CM

## 2023-02-08 DIAGNOSIS — N18.2 STAGE 2 CHRONIC KIDNEY DISEASE: ICD-10-CM

## 2023-02-08 LAB
ANION GAP SERPL CALCULATED.3IONS-SCNC: 5 MMOL/L (ref 4–13)
BUN SERPL-MCNC: 27 MG/DL (ref 5–25)
CALCIUM SERPL-MCNC: 9.1 MG/DL (ref 8.3–10.1)
CHLORIDE SERPL-SCNC: 107 MMOL/L (ref 96–108)
CHOLEST SERPL-MCNC: 144 MG/DL
CO2 SERPL-SCNC: 25 MMOL/L (ref 21–32)
CREAT SERPL-MCNC: 1.17 MG/DL (ref 0.6–1.3)
GFR SERPL CREATININE-BSD FRML MDRD: 66 ML/MIN/1.73SQ M
GLUCOSE P FAST SERPL-MCNC: 151 MG/DL (ref 65–99)
HDLC SERPL-MCNC: 30 MG/DL
LDLC SERPL DIRECT ASSAY-MCNC: 72 MG/DL (ref 0–100)
POTASSIUM SERPL-SCNC: 3.9 MMOL/L (ref 3.5–5.3)
SODIUM SERPL-SCNC: 137 MMOL/L (ref 135–147)
TRIGL SERPL-MCNC: 579 MG/DL

## 2023-02-09 LAB
EST. AVERAGE GLUCOSE BLD GHB EST-MCNC: 206 MG/DL
HBA1C MFR BLD: 8.8 %

## 2023-02-10 ENCOUNTER — HOSPITAL ENCOUNTER (OUTPATIENT)
Dept: NON INVASIVE DIAGNOSTICS | Facility: HOSPITAL | Age: 63
Discharge: HOME | End: 2023-02-10
Attending: INTERNAL MEDICINE

## 2023-02-10 VITALS
SYSTOLIC BLOOD PRESSURE: 116 MMHG | DIASTOLIC BLOOD PRESSURE: 82 MMHG | BODY MASS INDEX: 26.95 KG/M2 | HEIGHT: 74 IN | WEIGHT: 210 LBS | HEART RATE: 104 BPM

## 2023-02-10 DIAGNOSIS — Z82.49 FAMILY HISTORY OF EARLY CAD: ICD-10-CM

## 2023-02-10 DIAGNOSIS — R07.9 CHEST PAIN, UNSPECIFIED TYPE: Primary | ICD-10-CM

## 2023-02-10 DIAGNOSIS — Z98.890 HISTORY OF PERICARDIECTOMY: ICD-10-CM

## 2023-02-10 DIAGNOSIS — R07.9 CHEST PAIN, UNSPECIFIED TYPE: ICD-10-CM

## 2023-02-10 DIAGNOSIS — I42.9 CARDIOMYOPATHY, UNSPECIFIED TYPE (HCC): ICD-10-CM

## 2023-02-10 LAB
AORTIC ROOT: 3.6 CM
APICAL FOUR CHAMBER EJECTION FRACTION: 49 %
ASCENDING AORTA: 4 CM
E WAVE DECELERATION TIME: 194 MS
FRACTIONAL SHORTENING: 23 (ref 28–44)
GLOBAL LONGITUIDAL STRAIN: -12 %
INTERVENTRICULAR SEPTUM IN DIASTOLE (PARASTERNAL SHORT AXIS VIEW): 1.2 CM
INTERVENTRICULAR SEPTUM: 1.2 CM (ref 0.6–1.1)
LAAS-AP4: 17.9 CM2
LEFT ATRIUM SIZE: 4.4 CM
LEFT INTERNAL DIMENSION IN SYSTOLE: 3.7 CM (ref 2.1–4)
LEFT VENTRICLE DIASTOLIC VOLUME (MOD BIPLANE): 71 ML
LEFT VENTRICLE SYSTOLIC VOLUME (MOD BIPLANE): 41 ML
LEFT VENTRICULAR INTERNAL DIMENSION IN DIASTOLE: 4.8 CM (ref 3.5–6)
LEFT VENTRICULAR POSTERIOR WALL IN END DIASTOLE: 1.3 CM
LEFT VENTRICULAR STROKE VOLUME: 51 ML
LV EF: 43 %
LVSV (TEICH): 51 ML
MV E'TISSUE VEL-SEP: 9 CM/S
MV PEAK A VEL: 0.67 M/S
MV PEAK E VEL: 46 CM/S
MV STENOSIS PRESSURE HALF TIME: 56 MS
MV VALVE AREA P 1/2 METHOD: 3.93
RIGHT ATRIAL 2D VOLUME: 31 ML
RIGHT ATRIUM AREA SYSTOLE A4C: 14.4 CM2
RIGHT VENTRICLE ID DIMENSION: 3.8 CM
SINOTUBULAR JUNCTION: 3.4 CM
SL CV LV EF: 49
SL CV PED ECHO LEFT VENTRICLE DIASTOLIC VOLUME (MOD BIPLANE) 2D: 108 ML
SL CV PED ECHO LEFT VENTRICLE SYSTOLIC VOLUME (MOD BIPLANE) 2D: 57 ML
STJ: 3.4 CM

## 2023-02-10 NOTE — NURSING NOTE
After review of echo images, Dr Brigid Rausch ordered nuclear stress test, and to cancel treadmill stress

## 2023-02-10 NOTE — PROGRESS NOTES
Patient came for echo and stress test today, but echo itself showed wall motion abnormalities with hypokinesis of basal septal wall as well as basal to mid inferior wall  With this new finding of regional wall motion abnormalities, and his prior reported symptoms of chest discomfort, I recommend switching the stress test to an exercise nuclear stress to clarify ischemia v/s infarct, and better assess EF  Order for exercise nuclear stress test is placed, and will be rescheduled for the same

## 2023-02-14 ENCOUNTER — OFFICE VISIT (OUTPATIENT)
Dept: FAMILY MEDICINE CLINIC | Facility: CLINIC | Age: 63
End: 2023-02-14

## 2023-02-14 VITALS
DIASTOLIC BLOOD PRESSURE: 64 MMHG | TEMPERATURE: 98 F | HEART RATE: 81 BPM | SYSTOLIC BLOOD PRESSURE: 118 MMHG | RESPIRATION RATE: 16 BRPM | OXYGEN SATURATION: 98 % | WEIGHT: 213.8 LBS | HEIGHT: 74 IN | BODY MASS INDEX: 27.44 KG/M2

## 2023-02-14 DIAGNOSIS — E11.9 TYPE 2 DIABETES MELLITUS WITHOUT COMPLICATION, WITHOUT LONG-TERM CURRENT USE OF INSULIN (HCC): Primary | ICD-10-CM

## 2023-02-14 DIAGNOSIS — E78.2 MIXED HYPERLIPIDEMIA: ICD-10-CM

## 2023-02-14 DIAGNOSIS — Z87.39 HISTORY OF GOUT: ICD-10-CM

## 2023-02-14 DIAGNOSIS — I10 HTN (HYPERTENSION), BENIGN: ICD-10-CM

## 2023-02-14 NOTE — PROGRESS NOTES
Assessment/Plan:         Problem List Items Addressed This Visit        Endocrine    Type 2 diabetes mellitus without complication, without long-term current use of insulin ([address] ) - Primary     Under reasonable control    Continue current medication  We will re-evaluate at next office visit  Lab Results   Component Value Date    HGBA1C 8 8 (H) 02/08/2023            Relevant Orders    Microalbumin / creatinine urine ratio    Hemoglobin A1C       Cardiovascular and Mediastinum    HTN (hypertension), benign     Under control  Continue current medication  We will re-evaluate at next office visit  Other    Mixed hyperlipidemia     Under control  Continue current medication  We will re-evaluate at next office visit  History of gout     Under control  Continue current medication  We will re-evaluate at next office visit  Subjective:      Patient ID: Madi Wilson is a 58 y o  male  Patient here for review of chronic medical problems and review of the labs and imaging if it is applicable  Currently has no specific complaints other than mentioned in the review of systems  Denies chest pain, SOB, cough, abdominal pain, nausea, vomiting, fever, chills, lightheadedness, dizziness,headache, tingling or numbness  No bowel or bladder problem  The following portions of the patient's history were reviewed and updated as appropriate:   Past Medical History:  He has a past medical history of Allergic rhinitis, Carotid stenosis, Colon polyps, Degenerative joint disease, Diabetes mellitus ([address] ), Diverticulitis, Glaucoma, Gout, Hyperlipidemia, Kidney stones, Obesity, and Pericarditis  ,  _______________________________________________________________________  Medical Problems:  does not have any pertinent problems on file ,  _______________________________________________________________________  Past Surgical History:   has a past surgical history that includes Retinal detachment surgery (Left); Colonoscopy; Cardiac surgery; and Cataract extraction (Bilateral)  ,  _______________________________________________________________________  Family History:  family history includes Arthritis in his mother; Coronary artery disease in his brother; Diabetes in his mother ,  _______________________________________________________________________  Social History:   reports that he has never smoked  He has been exposed to tobacco smoke  He has never used smokeless tobacco  He reports that he does not currently use alcohol  He reports that he does not use drugs  ,  _______________________________________________________________________  Allergies:  has No Known Allergies     _______________________________________________________________________  Current Outpatient Medications   Medication Sig Dispense Refill   • allopurinol (ZYLOPRIM) 300 mg tablet Take 1 tablet (300 mg total) by mouth daily 90 tablet 0   • aspirin 81 mg chewable tablet Daily     • Cholecalciferol (Vitamin D3) 1 25 MG (20268 UT) TABS Daily     • Empagliflozin (Jardiance) 25 MG TABS Take 1 tablet (25 mg total) by mouth daily after breakfast 30 tablet 3   • losartan (COZAAR) 25 mg tablet Take 1 tablet (25 mg total) by mouth daily 30 tablet 3   • metFORMIN (GLUCOPHAGE) 1000 MG tablet Take 1 tablet (1,000 mg total) by mouth 2 (two) times a day with meals 180 tablet 0   • rosuvastatin (CRESTOR) 10 MG tablet Take 1 tablet (10 mg total) by mouth daily 30 tablet 3   • Rybelsus 14 MG TABS Take 1 tablet by mouth daily 30 tablet 2     No current facility-administered medications for this visit      _______________________________________________________________________  Review of Systems   Constitutional: Negative for chills, fatigue and fever  HENT: Negative for congestion, ear pain, rhinorrhea, sneezing and sore throat  Eyes: Positive for visual disturbance  Negative for pain, discharge, redness and itching     Respiratory: Negative for cough, chest tightness and shortness of breath  Cardiovascular: Negative for chest pain, palpitations and leg swelling  Gastrointestinal: Negative for abdominal pain, blood in stool, diarrhea, nausea and vomiting  Endocrine: Negative for cold intolerance, heat intolerance, polydipsia, polyphagia and polyuria  Genitourinary: Negative for dysuria, frequency, hematuria and urgency  Musculoskeletal: Negative for arthralgias, back pain and myalgias  Skin: Negative for color change and rash  Neurological: Negative for dizziness, weakness, light-headedness, numbness and headaches  Hematological: Does not bruise/bleed easily  Psychiatric/Behavioral: Negative for agitation, behavioral problems and confusion  Objective:  Vitals:    02/14/23 1432   BP: 118/64   BP Location: Left arm   Patient Position: Sitting   Cuff Size: Standard   Pulse: 81   Resp: 16   Temp: 98 °F (36 7 °C)   TempSrc: Tympanic   SpO2: 98%   Weight: 97 kg (213 lb 12 8 oz)   Height: 6' 2" (1 88 m)     Body mass index is 27 45 kg/m²  Physical Exam  Vitals and nursing note reviewed  Constitutional:       General: He is not in acute distress  Appearance: Normal appearance  He is not ill-appearing, toxic-appearing or diaphoretic  HENT:      Head: Normocephalic and atraumatic  Right Ear: Tympanic membrane and ear canal normal       Left Ear: Tympanic membrane and ear canal normal       Nose: Nose normal  No congestion  Mouth/Throat:      Mouth: Mucous membranes are moist    Eyes:      General: No scleral icterus  Right eye: No discharge  Left eye: No discharge  Extraocular Movements: Extraocular movements intact  Conjunctiva/sclera: Conjunctivae normal       Pupils: Pupils are equal, round, and reactive to light  Cardiovascular:      Rate and Rhythm: Normal rate and regular rhythm  Pulses: Normal pulses  Heart sounds: Normal heart sounds  No murmur heard      No gallop  Pulmonary:      Effort: Pulmonary effort is normal  No respiratory distress  Breath sounds: Normal breath sounds  No wheezing, rhonchi or rales  Abdominal:      General: Abdomen is flat  Bowel sounds are normal  There is no distension  Palpations: Abdomen is soft  Tenderness: There is no abdominal tenderness  There is no right CVA tenderness, left CVA tenderness or guarding  Musculoskeletal:         General: No swelling or tenderness  Normal range of motion  Cervical back: Normal range of motion and neck supple  No rigidity  Right lower leg: No edema  Left lower leg: No edema  Lymphadenopathy:      Cervical: No cervical adenopathy  Skin:     General: Skin is warm  Capillary Refill: Capillary refill takes 2 to 3 seconds  Coloration: Skin is not jaundiced  Findings: No bruising or rash  Neurological:      General: No focal deficit present  Mental Status: He is alert and oriented to person, place, and time  Mental status is at baseline  Motor: No weakness        Gait: Gait normal    Psychiatric:         Mood and Affect: Mood normal          Behavior: Behavior normal

## 2023-02-14 NOTE — ASSESSMENT & PLAN NOTE
Under reasonable control    Continue current medication  We will re-evaluate at next office visit      Lab Results   Component Value Date    HGBA1C 8 8 (H) 02/08/2023

## 2023-02-21 ENCOUNTER — HOSPITAL ENCOUNTER (OUTPATIENT)
Dept: NON INVASIVE DIAGNOSTICS | Facility: HOSPITAL | Age: 63
Discharge: HOME | End: 2023-02-21

## 2023-02-21 ENCOUNTER — HOSPITAL ENCOUNTER (OUTPATIENT)
Facility: HOSPITAL | Age: 63
Discharge: HOME | End: 2023-02-21

## 2023-02-21 VITALS
DIASTOLIC BLOOD PRESSURE: 70 MMHG | OXYGEN SATURATION: 98 % | WEIGHT: 213 LBS | SYSTOLIC BLOOD PRESSURE: 138 MMHG | RESPIRATION RATE: 16 BRPM | HEIGHT: 74 IN | BODY MASS INDEX: 27.34 KG/M2 | HEART RATE: 80 BPM

## 2023-02-21 DIAGNOSIS — Z98.890 HISTORY OF PERICARDIECTOMY: ICD-10-CM

## 2023-02-21 DIAGNOSIS — R07.9 CHEST PAIN, UNSPECIFIED TYPE: ICD-10-CM

## 2023-02-21 DIAGNOSIS — I42.9 CARDIOMYOPATHY, UNSPECIFIED TYPE (HCC): ICD-10-CM

## 2023-02-21 DIAGNOSIS — Z82.49 FAMILY HISTORY OF EARLY CAD: ICD-10-CM

## 2023-02-21 LAB
MAX HR PERCENT: 92 %
MAX HR: 146 BPM
NUC STRESS EJECTION FRACTION: 54 %
RATE PRESSURE PRODUCT: NORMAL
SL CV REST NUCLEAR ISOTOPE DOSE: 10.07 MCI
SL CV STRESS NUCLEAR ISOTOPE DOSE: 32.9 MCI
SL CV STRESS RECOVERY BP: NORMAL MMHG
SL CV STRESS RECOVERY HR: 88 BPM
SL CV STRESS RECOVERY O2 SAT: 98 %
SL CV STRESS STAGE REACHED: 3
STRESS ANGINA INDEX: 0
STRESS BASELINE BP: NORMAL MMHG
STRESS BASELINE HR: 80 BPM
STRESS O2 SAT REST: 98 %
STRESS PEAK HR: 146 BPM
STRESS POST ESTIMATED WORKLOAD: 7 METS
STRESS POST EXERCISE DUR MIN: 6 MIN
STRESS POST EXERCISE DUR SEC: 3 SEC
STRESS POST O2 SAT PEAK: 98 %
STRESS POST PEAK BP: 156 MMHG
STRESS/REST PERFUSION RATIO: 0.92

## 2023-02-22 LAB
CHEST PAIN STATEMENT: NORMAL
MAX DIASTOLIC BP: 100 MMHG
MAX HEART RATE: 146 BPM
MAX PREDICTED HEART RATE: 158 BPM
MAX. SYSTOLIC BP: 174 MMHG
PROTOCOL NAME: NORMAL
TARGET HR FORMULA: NORMAL
TEST INDICATION: NORMAL
TIME IN EXERCISE PHASE: NORMAL

## 2023-03-06 DIAGNOSIS — E11.9 TYPE 2 DIABETES MELLITUS WITHOUT COMPLICATION, WITHOUT LONG-TERM CURRENT USE OF INSULIN (HCC): ICD-10-CM

## 2023-03-06 RX ORDER — ORAL SEMAGLUTIDE 14 MG/1
14 TABLET ORAL DAILY
Qty: 30 TABLET | Refills: 2 | Status: SHIPPED | OUTPATIENT
Start: 2023-03-06

## 2023-03-16 LAB
LEFT EYE DIABETIC RETINOPATHY: NORMAL
RIGHT EYE DIABETIC RETINOPATHY: NORMAL

## 2023-03-16 PROCEDURE — 2023F DILAT RTA XM W/O RTNOPTHY: CPT

## 2023-04-24 RX ORDER — SOD SULF/POT CHLORIDE/MAG SULF 1.479 G
TABLET ORAL
COMMUNITY
Start: 2023-02-24

## 2023-04-26 DIAGNOSIS — Z87.39 HISTORY OF GOUT: ICD-10-CM

## 2023-04-26 DIAGNOSIS — E11.9 TYPE 2 DIABETES MELLITUS WITHOUT COMPLICATION, WITHOUT LONG-TERM CURRENT USE OF INSULIN (HCC): ICD-10-CM

## 2023-04-26 RX ORDER — ALLOPURINOL 300 MG/1
300 TABLET ORAL DAILY
Qty: 90 TABLET | Refills: 0 | Status: SHIPPED | OUTPATIENT
Start: 2023-04-26

## 2023-04-27 ENCOUNTER — OFFICE VISIT (OUTPATIENT)
Dept: CARDIOLOGY CLINIC | Facility: CLINIC | Age: 63
End: 2023-04-27

## 2023-04-27 VITALS
HEIGHT: 74 IN | WEIGHT: 208 LBS | DIASTOLIC BLOOD PRESSURE: 80 MMHG | SYSTOLIC BLOOD PRESSURE: 124 MMHG | OXYGEN SATURATION: 98 % | HEART RATE: 105 BPM | BODY MASS INDEX: 26.69 KG/M2 | TEMPERATURE: 98.6 F

## 2023-04-27 DIAGNOSIS — N18.2 TYPE 2 DIABETES MELLITUS WITH STAGE 2 CHRONIC KIDNEY DISEASE, WITHOUT LONG-TERM CURRENT USE OF INSULIN (HCC): ICD-10-CM

## 2023-04-27 DIAGNOSIS — E11.22 TYPE 2 DIABETES MELLITUS WITH STAGE 2 CHRONIC KIDNEY DISEASE, WITHOUT LONG-TERM CURRENT USE OF INSULIN (HCC): ICD-10-CM

## 2023-04-27 DIAGNOSIS — Z82.49 FAMILY HISTORY OF EARLY CAD: ICD-10-CM

## 2023-04-27 DIAGNOSIS — Z98.890 HISTORY OF PERICARDIECTOMY: ICD-10-CM

## 2023-04-27 DIAGNOSIS — I42.9 CARDIOMYOPATHY, UNSPECIFIED TYPE (HCC): Primary | ICD-10-CM

## 2023-04-27 DIAGNOSIS — D75.1 ERYTHROCYTOSIS: ICD-10-CM

## 2023-04-27 DIAGNOSIS — E78.2 MIXED HYPERLIPIDEMIA: ICD-10-CM

## 2023-04-27 RX ORDER — ROSUVASTATIN CALCIUM 20 MG/1
20 TABLET, COATED ORAL DAILY
Qty: 90 TABLET | Refills: 3 | Status: SHIPPED | OUTPATIENT
Start: 2023-04-27

## 2023-04-27 NOTE — PROGRESS NOTES
[facility] [address] [place]  [place] [address]  Phone#  [phone]  Fax#  [phone]                                               Cardiology Office Consult  Candido Garcia, 58 y o  male  YOB: 1960  MRN: [id number] Encounter: [id number]      PCP - Jacob Marion MD  Referring Provider - No ref   provider found    Chief Complaint   Patient presents with   • Follow-up       Assessment  Cardiomyopathy  TTE: 2/10/23 - Dyskinesis/hypokinesis of basal-mid septal/inferior walls - ?related to prior surgery  Nuclear Rx: 23 - LVEF 54%, stress ECG nondiagnostic due to resting ST-T changes, small perfusion defect in basal inferoseptal inferior wall - possible artifact, no ischemia  Family history of early CAD  Brother was otherwise healthy, and suddenly needed CABG at 62  Father  suddenly of presumed heart attack  Diabetes Mellitus Type 2  A1c 7 2% (22)  CKD-2  Cr 1 35 in   Hyperlipidemia  s/p pericardiectomy (@[facility])  For infected pericardium  At age of 5 ()    Plan  Cardiomyopathy, Family history of early CAD, s/p pericardiectomy  Overview  His father  suddenly of a presumed heart attack, and his brother had CABG after otherwise seemingly being healthy and as a result this is got his wife very concerned  No major chest pain/pressure shortness of breath  ECG: sinus tachycardia, possible Left atrial enlargement, poor R-wave progression - cannot r/o old anterior infarct  TTE: Dyskinesis/hypokinesis of basal-mid septal/inferior walls - ?related to prior surgery  Nuclear Rx: LVEF 54%, stress ECG nondiagnostic due to resting ST-T changes, small perfusion defect in basal inferoseptal inferior wall - possible artifact, no ischemia  23: He continues to be free of chest pain or shortness of breath  Impression  Small area of dyskinesis in inferoseptal/inferior wall, possibly related to prior cardiac surgery, although cannot completely exclude CAD  Plan  With absence of ischemic symptoms, will continue to monitor clinically for now  Optimize risk factors -emphasized need for better control of diabetes, close follow-up with PCP  Avoid smoking    Hyperlipidemia    Allison has severe triglyceride elevation  Has been on rosuvastatin 10 mg daily  10-year ASCVD risk score is 21 7%   Increase rosuvastatin to 20 mg daily  Follow up lipid panel    Erythrocytosis  RBC count 5 6, Hb 16 9 in 9/2020  Denies any recent smoking or smoke exposure  Follow up CBC    Ascending aortic dilatation  Ascending aorta measured 4 cm on recent TTE  Optimize blood pressure control, cholesterol levels  Monitor with follow-up echocardiogram    No results found for this visit on 04/27/23  Orders Placed This Encounter   Procedures   • Lipid Panel with Direct LDL reflex   • CBC and Platelet       Return in about 6 months (around 10/27/2023), or if symptoms worsen or fail to improve  History of Present Illness   58 y o  male , who works as  at [employer], comes in as a new patient for consultation regarding cardiac risk stratification due to family history of early CAD  He reports no active or recent symptoms of chest pain or shortness of breath  No complaints of palpitations, dizziness or lightheadedness, near-syncope or syncope  No prior history of CAD or heart failure  He remains quite active with working and reports being able to go up 2 flights of stairs without any major symptoms  Recently his brother was also quite healthy suddenly needed bypass surgery, and this is got his wife very concerned  Additionally his father also has a history of dying suddenly in his 62s, and as a result he is here for cardiac risk stratification    He does report to me that he had a cardiac surgery at the age of 5, which involved stripping the entire covering around the heart due to an infection in that area    This was reportedly done "at [facility] around [address] [facility] [address]  He had limited follow-up after that, but has not seen a cardiologist in many decades  Family history  Father -  in sleep at 72, of presumed heart attack  Mother - went for pre-op for knee replacement, found to have cardiomyopathy and very low EF  Paternal uncle -  at age 28 of \"massive heart attack\"  Brother - CABG at age 62    Interval history - 2023  He comes back for follow-up after about 3 months  In the interim he completed the echocardiogram, which showed signs of dyskinesis/wall motion noted in inferoseptal/inferior wall, and as result his stress test was switched to a nuclear stress test   This did not reveal any significant evidence of ischemia or infarct, but there was an artifact  He has continued to be free of chest pain, shortness of breath in the interim, and remains active with maintenance, 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 and does not report any limitations with the same          Historical Information   Past Medical History:   Diagnosis Date   • Allergic rhinitis    • Carotid stenosis    • Colon polyps    • Degenerative joint disease    • Diabetes mellitus (HCC)    • Diverticulitis    • Glaucoma    • Gout    • Hyperlipidemia    • Kidney stones    • Obesity    • Pericarditis      Past Surgical History:   Procedure Laterality Date   • CARDIAC SURGERY     • CATARACT EXTRACTION Bilateral     Left eye 2021 and right eye 5/3/2021   • COLONOSCOPY     • RETINAL DETACHMENT SURGERY Left      Family History   Problem Relation Age of Onset   • Coronary artery disease Mother    • Diabetes Mother    • Arthritis Mother         Rheumatoid   • Heart attack Father    • Coronary artery disease Brother    • Heart attack Paternal Grandmother    • Heart attack Paternal Grandfather      Current Outpatient Medications on File Prior to Visit   Medication Sig Dispense Refill   • Rybelsus 14 MG tablet Take 1 tablet (14 mg total) by mouth daily 30 " "tablet 2   • allopurinol (ZYLOPRIM) 300 mg tablet Take 1 tablet (300 mg total) by mouth daily 90 tablet 0   • aspirin 81 mg chewable tablet Daily     • Cholecalciferol (Vitamin D3) 1 25 MG (54788 UT) TABS Daily     • Empagliflozin (Jardiance) 25 MG TABS Take 1 tablet (25 mg total) by mouth daily after breakfast 30 tablet 3   • losartan (COZAAR) 25 mg tablet Take 1 tablet (25 mg total) by mouth daily 30 tablet 3   • metFORMIN (GLUCOPHAGE) 1000 MG tablet Take 1 tablet (1,000 mg total) by mouth 2 (two) times a day with meals 180 tablet 0   • Sutab 6829-675-497 MG TABS Use as directed     • [DISCONTINUED] rosuvastatin (CRESTOR) 10 MG tablet Take 1 tablet (10 mg total) by mouth daily 30 tablet 3     No current facility-administered medications on file prior to visit  No Known Allergies  Social History     Socioeconomic History   • Marital status: /Civil Union     Spouse name: None   • Number of children: None   • Years of education: None   • Highest education level: None   Occupational History   • None   Tobacco Use   • Smoking status: Never     Passive exposure: Past   • Smokeless tobacco: Never   Vaping Use   • Vaping Use: Never used   Substance and Sexual Activity   • Alcohol use: Not Currently   • Drug use: Never   • Sexual activity: None   Other Topics Concern   • None   Social History Narrative   • None     Social Determinants of Health     Financial Resource Strain: Not on file   Food Insecurity: Not on file   Transportation Needs: Not on file   Physical Activity: Not on file   Stress: Not on file   Social Connections: Not on file   Intimate Partner Violence: Not on file   Housing Stability: Not on file        Review of Systems   All other systems reviewed and are negative          Vitals:  Vitals:    04/27/23 1336   BP: 124/80   BP Location: Left arm   Patient Position: Sitting   Cuff Size: Standard   Pulse: 105   Temp: 98 6 °F (37 °C)   SpO2: 98%   Weight: 94 3 kg (208 lb)   Height: 6' 2\" (1 88 m) " BMI - Body mass index is 26 71 kg/m²  Wt Readings from Last 7 Encounters:   04/27/23 94 3 kg (208 lb)   02/21/23 96 6 kg (213 lb)   02/14/23 97 kg (213 lb 12 8 oz)   02/10/23 95 3 kg (210 lb)   01/26/23 95 3 kg (210 lb)   09/29/22 95 7 kg (211 lb)   09/05/20 98 9 kg (218 lb)       Physical Exam  Vitals and nursing note reviewed  Constitutional:       General: He is not in acute distress  Appearance: He is well-developed  He is not ill-appearing or diaphoretic  HENT:      Head: Normocephalic and atraumatic  Nose: No congestion  Eyes:      General: No scleral icterus  Conjunctiva/sclera: Conjunctivae normal    Neck:      Vascular: No carotid bruit or JVD  Cardiovascular:      Rate and Rhythm: Normal rate and regular rhythm  Heart sounds: Normal heart sounds  No murmur heard  No friction rub  No gallop  Pulmonary:      Effort: Pulmonary effort is normal  No respiratory distress  Breath sounds: Normal breath sounds  No wheezing or rales  Chest:      Chest wall: No tenderness  Abdominal:      General: There is no distension  Palpations: Abdomen is soft  Tenderness: There is no abdominal tenderness  Musculoskeletal:         General: No swelling, tenderness or deformity  Cervical back: Neck supple  No muscular tenderness  Right lower leg: No edema  Left lower leg: No edema  Skin:     General: Skin is warm  Neurological:      General: No focal deficit present  Mental Status: He is alert and oriented to person, place, and time  Mental status is at baseline  Psychiatric:         Mood and Affect: Mood normal          Behavior: Behavior normal          Thought Content:  Thought content normal            Labs:  CBC:   Lab Results   Component Value Date    WBC 11 58 (H) 09/05/2020    RBC 5 65 (H) 09/05/2020    HGB 16 9 09/05/2020    HCT 49 2 09/05/2020    MCV 87 09/05/2020     09/05/2020    RDW 12 6 09/05/2020       CMP:   Lab Results Component Value Date    K 3 9 02/08/2023     02/08/2023    CO2 25 02/08/2023    BUN 27 (H) 02/08/2023    CREATININE 1 17 02/08/2023    EGFR 66 02/08/2023    CALCIUM 9 1 02/08/2023    AST 29 09/05/2020    ALT 27 09/05/2020    ALKPHOS 85 7 09/05/2020       Magnesium:  No results found for: MG    Lipid Profile:   Lab Results   Component Value Date    HDL 30 (L) 02/08/2023    TRIG 579 (H) 02/08/2023    [address]  02/08/2023      Comment:      Calculated LDL invalid, triglycerides >400 mg/dl       Thyroid Studies: No results found for: DRQ1XMCAANJX, T3FREE, FREET4, U4RSQBX, L2EJKZZ    A1c:  No components found for: HGA1C    INR:  No results found for: IIN4    Imaging: No results found  Cardiac testing:   No results found for this or any previous visit  No results found for this or any previous visit  No results found for this or any previous visit  No results found for this or any previous visit        Stress strip  Confirmed by SANFORD BENSON ([id number]),  Pop Caro ([id number]) on 2/22/2023 9:12:00 AM

## 2023-05-17 ENCOUNTER — APPOINTMENT (OUTPATIENT)
Dept: LAB | Facility: MEDICAL CENTER | Age: 63
End: 2023-05-17

## 2023-05-17 DIAGNOSIS — E11.9 TYPE 2 DIABETES MELLITUS WITHOUT COMPLICATION, WITHOUT LONG-TERM CURRENT USE OF INSULIN (HCC): ICD-10-CM

## 2023-05-17 DIAGNOSIS — E78.2 MIXED HYPERLIPIDEMIA: ICD-10-CM

## 2023-05-17 LAB
CHOLEST SERPL-MCNC: 137 MG/DL
CREAT UR-MCNC: 104 MG/DL
ERYTHROCYTE [DISTWIDTH] IN BLOOD BY AUTOMATED COUNT: 12.7 % (ref 11.6–15.1)
EST. AVERAGE GLUCOSE BLD GHB EST-MCNC: 160 MG/DL
HBA1C MFR BLD: 7.2 %
HCT VFR BLD AUTO: 53.4 % (ref 36.5–49.3)
HDLC SERPL-MCNC: 38 MG/DL
HGB BLD-MCNC: 17.5 G/DL (ref 12–17)
LDLC SERPL CALC-MCNC: 76 MG/DL (ref 0–100)
MCH RBC QN AUTO: 30.3 PG (ref 26.8–34.3)
MCHC RBC AUTO-ENTMCNC: 32.8 G/DL (ref 31.4–37.4)
MCV RBC AUTO: 92 FL (ref 82–98)
MICROALBUMIN UR-MCNC: 56.8 MG/L (ref 0–20)
MICROALBUMIN/CREAT 24H UR: 55 MG/G CREATININE (ref 0–30)
PLATELET # BLD AUTO: 201 THOUSANDS/UL (ref 149–390)
PMV BLD AUTO: 9.9 FL (ref 8.9–12.7)
RBC # BLD AUTO: 5.78 MILLION/UL (ref 3.88–5.62)
TRIGL SERPL-MCNC: 116 MG/DL
WBC # BLD AUTO: 6.34 THOUSAND/UL (ref 4.31–10.16)

## 2023-05-18 ENCOUNTER — OFFICE VISIT (OUTPATIENT)
Dept: FAMILY MEDICINE CLINIC | Facility: CLINIC | Age: 63
End: 2023-05-18

## 2023-05-18 VITALS
DIASTOLIC BLOOD PRESSURE: 76 MMHG | SYSTOLIC BLOOD PRESSURE: 124 MMHG | HEART RATE: 80 BPM | HEIGHT: 74 IN | RESPIRATION RATE: 18 BRPM | OXYGEN SATURATION: 97 % | TEMPERATURE: 97.7 F | WEIGHT: 208.8 LBS | BODY MASS INDEX: 26.8 KG/M2

## 2023-05-18 DIAGNOSIS — E11.9 TYPE 2 DIABETES MELLITUS WITHOUT COMPLICATION, WITHOUT LONG-TERM CURRENT USE OF INSULIN (HCC): Primary | ICD-10-CM

## 2023-05-18 DIAGNOSIS — I10 HTN (HYPERTENSION), BENIGN: ICD-10-CM

## 2023-05-18 DIAGNOSIS — Z87.39 HISTORY OF GOUT: ICD-10-CM

## 2023-05-18 DIAGNOSIS — E78.2 MIXED HYPERLIPIDEMIA: ICD-10-CM

## 2023-05-18 NOTE — PROGRESS NOTES
Assessment/Plan:         Problem List Items Addressed This Visit        Endocrine    Type 2 diabetes mellitus without complication, without long-term current use of insulin ([address] ) - Primary       Lab Results   Component Value Date    HGBA1C 7 2 (H) 05/17/2023   Under control  Continue current medication  We will re-evaluate at next office visit  Cardiovascular and Mediastinum    HTN (hypertension), benign     Under control  Continue current medication  We will re-evaluate at next office visit  Other    Mixed hyperlipidemia     Under control  Continue current medication  We will re-evaluate at next office visit  History of gout     Under control  Continue current medication  We will re-evaluate at next office visit  Subjective:      Patient ID: Celia Dennis is a 61 y o  male  Patient here for review of chronic medical problems and  the labs and imaging if it is applicable  Currently has no specific complaints other than mentioned in the review of systems  Denies chest pain, SOB, cough, abdominal pain, nausea, vomiting, fever, chills, lightheadedness, dizziness,headache, tingling or numbness  No bowel or bladder problem  The following portions of the patient's history were reviewed and updated as appropriate:   Past Medical History:  He has a past medical history of Allergic rhinitis, Carotid stenosis, Colon polyps, Degenerative joint disease, Diabetes mellitus ([address] ), Diverticulitis, Glaucoma, Gout, Hyperlipidemia, Kidney stones, Obesity, and Pericarditis  ,  _______________________________________________________________________  Medical Problems:  does not have any pertinent problems on file ,  _______________________________________________________________________  Past Surgical History:   has a past surgical history that includes Retinal detachment surgery (Left);  Colonoscopy; Cardiac surgery; and Cataract extraction (Bilateral)  ,  _______________________________________________________________________  Family History:  family history includes Arthritis in his mother; Coronary artery disease in his brother and mother; Diabetes in his mother; Heart attack in his father, paternal grandfather, and paternal grandmother ,  _______________________________________________________________________  Social History:   reports that he has never smoked  He has been exposed to tobacco smoke  He has never used smokeless tobacco  He reports that he does not currently use alcohol  He reports that he does not use drugs  ,  _______________________________________________________________________  Allergies:  has No Known Allergies     _______________________________________________________________________  Current Outpatient Medications   Medication Sig Dispense Refill   • allopurinol (ZYLOPRIM) 300 mg tablet Take 1 tablet (300 mg total) by mouth daily 90 tablet 0   • aspirin 81 mg chewable tablet Daily     • Cholecalciferol (Vitamin D3) 1 25 MG (12437 UT) TABS Daily     • Empagliflozin (Jardiance) 25 MG TABS Take 1 tablet (25 mg total) by mouth daily after breakfast 30 tablet 3   • losartan (COZAAR) 25 mg tablet Take 1 tablet (25 mg total) by mouth daily 30 tablet 3   • metFORMIN (GLUCOPHAGE) 1000 MG tablet Take 1 tablet (1,000 mg total) by mouth 2 (two) times a day with meals 180 tablet 0   • rosuvastatin (CRESTOR) 20 MG tablet Take 1 tablet (20 mg total) by mouth daily 90 tablet 3   • Rybelsus 14 MG tablet Take 1 tablet (14 mg total) by mouth daily 30 tablet 2     No current facility-administered medications for this visit      _______________________________________________________________________  Review of Systems   Constitutional: Negative for chills, fatigue and fever  HENT: Negative for congestion, ear pain, rhinorrhea, sneezing and sore throat  Eyes: Positive for visual disturbance  Negative for pain, discharge, redness and itching  "  Respiratory: Negative for cough, chest tightness and shortness of breath  Cardiovascular: Negative for chest pain, palpitations and leg swelling  Gastrointestinal: Negative for abdominal pain, blood in stool, diarrhea, nausea and vomiting  Endocrine: Negative for cold intolerance, heat intolerance, polydipsia, polyphagia and polyuria  Genitourinary: Negative for dysuria, frequency, hematuria and urgency  Musculoskeletal: Negative for arthralgias, back pain and myalgias  Skin: Negative for color change and rash  Neurological: Negative for dizziness, weakness, light-headedness, numbness and headaches  Hematological: Does not bruise/bleed easily  Psychiatric/Behavioral: Negative for agitation, behavioral problems and confusion  Objective:  Vitals:    05/18/23 0949   BP: 124/76   BP Location: Left arm   Patient Position: Sitting   Cuff Size: Standard   Pulse: 80   Resp: 18   Temp: 97 7 °F (36 5 °C)   TempSrc: Tympanic   SpO2: 97%   Weight: 94 7 kg (208 lb 12 8 oz)   Height: 6' 2\" (1 88 m)     Body mass index is 26 81 kg/m²  Physical Exam  Vitals and nursing note reviewed  Constitutional:       General: He is not in acute distress  Appearance: Normal appearance  He is not ill-appearing, toxic-appearing or diaphoretic  HENT:      Head: Normocephalic and atraumatic  Right Ear: Tympanic membrane and ear canal normal       Left Ear: Tympanic membrane and ear canal normal       Nose: Nose normal  No congestion  Mouth/Throat:      Mouth: Mucous membranes are moist    Eyes:      General: No scleral icterus  Right eye: No discharge  Left eye: No discharge  Extraocular Movements: Extraocular movements intact  Conjunctiva/sclera: Conjunctivae normal       Pupils: Pupils are equal, round, and reactive to light  Cardiovascular:      Rate and Rhythm: Normal rate and regular rhythm  Pulses: Normal pulses  Heart sounds: Normal heart sounds   No " murmur heard  No gallop  Pulmonary:      Effort: Pulmonary effort is normal  No respiratory distress  Breath sounds: Normal breath sounds  No wheezing, rhonchi or rales  Abdominal:      General: Abdomen is flat  Bowel sounds are normal  There is no distension  Palpations: Abdomen is soft  Tenderness: There is no abdominal tenderness  There is no right CVA tenderness, left CVA tenderness or guarding  Musculoskeletal:         General: No swelling or tenderness  Normal range of motion  Cervical back: Normal range of motion and neck supple  No rigidity  Right lower leg: No edema  Left lower leg: No edema  Lymphadenopathy:      Cervical: No cervical adenopathy  Skin:     General: Skin is warm  Capillary Refill: Capillary refill takes 2 to 3 seconds  Coloration: Skin is not jaundiced  Findings: No bruising or rash  Neurological:      General: No focal deficit present  Mental Status: He is alert and oriented to person, place, and time  Mental status is at baseline  Motor: No weakness  Gait: Gait normal    Psychiatric:         Mood and Affect: Mood normal          Behavior: Behavior normal        Diabetic Foot Exam    Patient's shoes and socks removed        Pt  Refused exam

## 2023-05-18 NOTE — ASSESSMENT & PLAN NOTE
Lab Results   Component Value Date    HGBA1C 7 2 (H) 05/17/2023   Under control  Continue current medication  We will re-evaluate at next office visit

## 2023-05-26 ENCOUNTER — TELEPHONE (OUTPATIENT)
Dept: FAMILY MEDICINE CLINIC | Facility: CLINIC | Age: 63
End: 2023-05-26

## 2023-05-26 DIAGNOSIS — I10 HTN (HYPERTENSION), BENIGN: ICD-10-CM

## 2023-05-26 DIAGNOSIS — E11.9 TYPE 2 DIABETES MELLITUS WITHOUT COMPLICATION, WITHOUT LONG-TERM CURRENT USE OF INSULIN (HCC): ICD-10-CM

## 2023-05-26 RX ORDER — LOSARTAN POTASSIUM 25 MG/1
25 TABLET ORAL DAILY
Qty: 30 TABLET | Refills: 3 | Status: SHIPPED | OUTPATIENT
Start: 2023-05-26

## 2023-06-13 ENCOUNTER — TELEPHONE (OUTPATIENT)
Dept: FAMILY MEDICINE CLINIC | Facility: CLINIC | Age: 63
End: 2023-06-13

## 2023-06-13 DIAGNOSIS — E11.9 TYPE 2 DIABETES MELLITUS WITHOUT COMPLICATION, WITHOUT LONG-TERM CURRENT USE OF INSULIN (HCC): ICD-10-CM

## 2023-06-13 RX ORDER — ORAL SEMAGLUTIDE 14 MG/1
14 TABLET ORAL DAILY
Qty: 30 TABLET | Refills: 2 | Status: SHIPPED | OUTPATIENT
Start: 2023-06-13

## 2023-06-13 NOTE — TELEPHONE ENCOUNTER
Fax received from [facility] in [place], requesting refill on Rybelsus 14 mg TAB, done and sent

## 2023-07-18 LAB
LEFT EYE DIABETIC RETINOPATHY: NORMAL
RIGHT EYE DIABETIC RETINOPATHY: NORMAL
SEVERITY (EYE EXAM): NORMAL

## 2023-07-25 ENCOUNTER — VBI (OUTPATIENT)
Dept: ADMINISTRATIVE | Facility: OTHER | Age: 63
End: 2023-07-25

## 2023-07-25 NOTE — TELEPHONE ENCOUNTER
Upon review of the In Basket request we were able to locate, review, and update the patient chart as requested for Diabetic Eye Exam.    Any additional questions or concerns should be emailed to the Practice Liaisons via the appropriate education email address, please do not reply via In Basket.     Thank you  Kristan Vasquez MA

## 2023-07-26 DIAGNOSIS — Z87.39 HISTORY OF GOUT: ICD-10-CM

## 2023-07-26 DIAGNOSIS — E11.9 TYPE 2 DIABETES MELLITUS WITHOUT COMPLICATION, WITHOUT LONG-TERM CURRENT USE OF INSULIN (HCC): ICD-10-CM

## 2023-07-26 RX ORDER — ALLOPURINOL 300 MG/1
300 TABLET ORAL DAILY
Qty: 90 TABLET | Refills: 0 | Status: SHIPPED | OUTPATIENT
Start: 2023-07-26

## 2023-08-01 ENCOUNTER — TELEPHONE (OUTPATIENT)
Dept: CARDIOLOGY CLINIC | Facility: MEDICAL CENTER | Age: 63
End: 2023-08-01

## 2023-08-08 ENCOUNTER — OFFICE VISIT (OUTPATIENT)
Dept: FAMILY MEDICINE CLINIC | Facility: CLINIC | Age: 63
End: 2023-08-08
Payer: COMMERCIAL

## 2023-08-08 VITALS
HEIGHT: 74 IN | OXYGEN SATURATION: 95 % | WEIGHT: 201 LBS | BODY MASS INDEX: 25.8 KG/M2 | TEMPERATURE: 97.8 F | HEART RATE: 93 BPM | SYSTOLIC BLOOD PRESSURE: 120 MMHG | DIASTOLIC BLOOD PRESSURE: 78 MMHG

## 2023-08-08 DIAGNOSIS — S01.81XA LACERATION OF FOREHEAD, INITIAL ENCOUNTER: Primary | ICD-10-CM

## 2023-08-08 PROBLEM — E03.9 ACQUIRED HYPOTHYROIDISM: Status: ACTIVE | Noted: 2023-08-08

## 2023-08-08 PROBLEM — I48.0 PAROXYSMAL ATRIAL FIBRILLATION (HCC): Status: ACTIVE | Noted: 2023-08-08

## 2023-08-08 PROCEDURE — 99213 OFFICE O/P EST LOW 20 MIN: CPT

## 2023-08-08 RX ORDER — METOPROLOL SUCCINATE 25 MG/1
25 TABLET, EXTENDED RELEASE ORAL DAILY
COMMUNITY
Start: 2023-08-03 | End: 2023-08-18 | Stop reason: SDUPTHER

## 2023-08-08 RX ORDER — CEFUROXIME AXETIL 500 MG/1
TABLET ORAL
COMMUNITY
Start: 2023-08-03

## 2023-08-08 RX ORDER — AZITHROMYCIN 500 MG/1
TABLET, FILM COATED ORAL
COMMUNITY
Start: 2023-08-03

## 2023-08-08 RX ORDER — LEVOTHYROXINE SODIUM 0.03 MG/1
25 TABLET ORAL DAILY
COMMUNITY
Start: 2023-08-03 | End: 2023-08-18 | Stop reason: SDUPTHER

## 2023-08-08 RX ORDER — PANTOPRAZOLE SODIUM 40 MG/1
40 TABLET, DELAYED RELEASE ORAL DAILY
COMMUNITY
Start: 2023-08-03

## 2023-08-08 NOTE — PROGRESS NOTES
Assessment/Plan:         Problem List Items Addressed This Visit    None  Visit Diagnoses     Laceration of forehead, initial encounter    -  Primary    Relevant Orders    Suture removal (Completed)            Subjective:      Patient ID: Blas Cortez is a 61 y.o. male. Patient is here for stitches removal on the left side of the forehead  Patient had a syncopal episode on July 28, 2023 in [place] state was and hit the head sustained laceration on the left side of the forehead he was hospitalized because of atrial fibrillation with rapid ventricular rate which ultimately had an ablation done on August 2, 2023 and now here for stitches removal patient currently otherwise feeling okay      The following portions of the patient's history were reviewed and updated as appropriate:   Past Medical History:  He has a past medical history of Allergic rhinitis, Carotid stenosis, Colon polyps, Degenerative joint disease, Diabetes mellitus ([address]), Diverticulitis, Glaucoma, Gout, Hyperlipidemia, Kidney stones, Obesity, and Pericarditis. ,  _______________________________________________________________________  Medical Problems:  does not have any pertinent problems on file.,  _______________________________________________________________________  Past Surgical History:   has a past surgical history that includes Retinal detachment surgery (Left); Colonoscopy; Cardiac surgery; and Cataract extraction (Bilateral). ,  _______________________________________________________________________  Family History:  family history includes Arthritis in his mother; Coronary artery disease in his brother and mother; Diabetes in his mother; Heart attack in his father, paternal grandfather, and paternal grandmother.,  _______________________________________________________________________  Social History:   reports that he has never smoked. He has been exposed to tobacco smoke.  He has never used smokeless tobacco. He reports that he does not currently use alcohol. He reports that he does not use drugs. ,  _______________________________________________________________________  Allergies:  has No Known Allergies. .  _______________________________________________________________________  Current Outpatient Medications   Medication Sig Dispense Refill   • allopurinol (ZYLOPRIM) 300 mg tablet Take 1 tablet (300 mg total) by mouth daily 90 tablet 0   • aspirin 81 mg chewable tablet Daily     • azithromycin (ZITHROMAX) 500 MG tablet take 1 tablet by mouth daily for 7 days     • cefuroxime (CEFTIN) 500 mg tablet take 1 tablet by mouth twice a day for 7 days     • Cholecalciferol (Vitamin D3) 1.25 MG (37330 UT) TABS Daily     • Empagliflozin (Jardiance) 25 MG TABS Take 1 tablet (25 mg total) by mouth daily after breakfast 30 tablet 3   • levothyroxine 25 mcg tablet Take 25 mcg by mouth daily     • losartan (COZAAR) 25 mg tablet Take 1 tablet (25 mg total) by mouth daily 30 tablet 3   • metFORMIN (GLUCOPHAGE) 1000 MG tablet Take 1 tablet (1,000 mg total) by mouth 2 (two) times a day with meals 180 tablet 0   • metoprolol succinate (TOPROL-XL) 25 mg 24 hr tablet Take 25 mg by mouth daily     • pantoprazole (PROTONIX) 40 mg tablet Take 40 mg by mouth daily     • rosuvastatin (CRESTOR) 20 MG tablet Take 1 tablet (20 mg total) by mouth daily 90 tablet 3   • Rybelsus 14 MG tablet Take 1 tablet (14 mg total) by mouth daily 30 tablet 2     No current facility-administered medications for this visit.     _______________________________________________________________________  Review of Systems   Constitutional: Negative for chills, fatigue and fever. HENT: Negative for congestion, ear pain, rhinorrhea, sneezing and sore throat. Eyes: Positive for visual disturbance. Negative for pain, discharge, redness and itching. Respiratory: Negative for cough, chest tightness and shortness of breath.     Cardiovascular: Negative for chest pain, palpitations and leg swelling. Gastrointestinal: Negative for abdominal pain, blood in stool, diarrhea, nausea and vomiting. Endocrine: Negative for cold intolerance, heat intolerance, polydipsia, polyphagia and polyuria. Genitourinary: Negative for dysuria, frequency, hematuria and urgency. Musculoskeletal: Negative for arthralgias, back pain and myalgias. Skin: Positive for wound (Laceration with stitches left forehead above the eyebrow). Negative for color change and rash. Neurological: Negative for dizziness, weakness, light-headedness, numbness and headaches. Hematological: Does not bruise/bleed easily. Psychiatric/Behavioral: Negative for agitation, behavioral problems and confusion. Objective:  Vitals:    08/08/23 1037   BP: 120/78   BP Location: Left arm   Patient Position: Sitting   Cuff Size: Adult   Pulse: 93   Temp: 97.8 °F (36.6 °C)   TempSrc: Tympanic   SpO2: 95%   Weight: 91.2 kg (201 lb)   Height: 6' 2" (1.88 m)     Body mass index is 25.81 kg/m². Physical Exam  Vitals and nursing note reviewed. Constitutional:       General: He is not in acute distress. Appearance: Normal appearance. He is not ill-appearing, toxic-appearing or diaphoretic. HENT:      Head: Normocephalic and atraumatic. Right Ear: Tympanic membrane and ear canal normal.      Left Ear: Tympanic membrane and ear canal normal.      Nose: Nose normal. No congestion. Mouth/Throat:      Mouth: Mucous membranes are moist.   Eyes:      General: No scleral icterus. Right eye: No discharge. Left eye: No discharge. Extraocular Movements: Extraocular movements intact. Conjunctiva/sclera: Conjunctivae normal.      Pupils: Pupils are equal, round, and reactive to light. Cardiovascular:      Rate and Rhythm: Normal rate and regular rhythm. Pulses: Normal pulses. Heart sounds: Normal heart sounds. No murmur heard. No gallop.    Pulmonary:      Effort: Pulmonary effort is normal. No respiratory distress. Breath sounds: Normal breath sounds. No wheezing, rhonchi or rales. Abdominal:      General: Abdomen is flat. Bowel sounds are normal. There is no distension. Palpations: Abdomen is soft. Tenderness: There is no abdominal tenderness. There is no right CVA tenderness, left CVA tenderness or guarding. Musculoskeletal:         General: No swelling or tenderness. Normal range of motion. Cervical back: Normal range of motion and neck supple. No rigidity. Right lower leg: No edema. Left lower leg: No edema. Lymphadenopathy:      Cervical: No cervical adenopathy. Skin:     General: Skin is warm. Capillary Refill: Capillary refill takes 2 to 3 seconds. Coloration: Skin is not jaundiced. Findings: Lesion (Laceration with stitches left side of forehead) present. No bruising or rash. Neurological:      General: No focal deficit present. Mental Status: He is alert and oriented to person, place, and time. Mental status is at baseline. Motor: No weakness. Gait: Gait normal.   Psychiatric:         Mood and Affect: Mood normal.         Behavior: Behavior normal.       Suture removal    Date/Time: 8/8/2023 10:30 AM    Performed by: Bhakti Massey MD  Authorized by: Bhakti Massey MD  Universal Protocol:  Consent: Verbal consent obtained. Consent given by: patient  Patient understanding: patient states understanding of the procedure being performed  Patient consent: the patient's understanding of the procedure matches consent given  Patient identity confirmed: verbally with patient        Patient location:  Clinic  Location:     Laterality:  Left    Location:  [address] [place] [address] location:  Forehead  Procedure details: Tools used:  Suture removal kit    Wound appearance:  No sign(s) of infection, good wound healing and clean  Post-procedure details:     Post-removal:  No dressing applied    Patient tolerance of procedure:   Tolerated well, no immediate complications

## 2023-08-08 NOTE — ASSESSMENT & PLAN NOTE
Under control. Continue current medication. We will re-evaluate at next office visit.     Lab Results   Component Value Date    HGBA1C 7.2 (H) 05/17/2023

## 2023-08-18 DIAGNOSIS — E03.9 ACQUIRED HYPOTHYROIDISM: ICD-10-CM

## 2023-08-18 DIAGNOSIS — I10 HTN (HYPERTENSION), BENIGN: Primary | ICD-10-CM

## 2023-08-18 RX ORDER — METOPROLOL SUCCINATE 25 MG/1
25 TABLET, EXTENDED RELEASE ORAL DAILY
Qty: 30 TABLET | Refills: 2 | Status: SHIPPED | OUTPATIENT
Start: 2023-08-18

## 2023-08-18 RX ORDER — LEVOTHYROXINE SODIUM 0.03 MG/1
25 TABLET ORAL DAILY
Qty: 30 TABLET | Refills: 2 | Status: SHIPPED | OUTPATIENT
Start: 2023-08-18

## 2023-09-12 ENCOUNTER — OFFICE VISIT (OUTPATIENT)
Dept: CARDIOLOGY CLINIC | Facility: CLINIC | Age: 63
End: 2023-09-12
Payer: COMMERCIAL

## 2023-09-12 VITALS
DIASTOLIC BLOOD PRESSURE: 70 MMHG | SYSTOLIC BLOOD PRESSURE: 126 MMHG | HEIGHT: 74 IN | WEIGHT: 204 LBS | BODY MASS INDEX: 26.18 KG/M2 | OXYGEN SATURATION: 96 % | HEART RATE: 64 BPM

## 2023-09-12 DIAGNOSIS — I10 HTN (HYPERTENSION), BENIGN: ICD-10-CM

## 2023-09-12 DIAGNOSIS — I42.9 CARDIOMYOPATHY, UNSPECIFIED TYPE (HCC): ICD-10-CM

## 2023-09-12 DIAGNOSIS — Z98.890 S/P RADIOFREQUENCY ABLATION OPERATION FOR ARRHYTHMIA: ICD-10-CM

## 2023-09-12 DIAGNOSIS — Z82.49 FAMILY HISTORY OF EARLY CAD: ICD-10-CM

## 2023-09-12 DIAGNOSIS — R55 SYNCOPE, UNSPECIFIED SYNCOPE TYPE: Primary | ICD-10-CM

## 2023-09-12 DIAGNOSIS — D75.1 ERYTHROCYTOSIS: ICD-10-CM

## 2023-09-12 DIAGNOSIS — E11.9 TYPE 2 DIABETES MELLITUS WITHOUT COMPLICATION, WITHOUT LONG-TERM CURRENT USE OF INSULIN (HCC): ICD-10-CM

## 2023-09-12 DIAGNOSIS — Z86.79 S/P RADIOFREQUENCY ABLATION OPERATION FOR ARRHYTHMIA: ICD-10-CM

## 2023-09-12 PROCEDURE — 93000 ELECTROCARDIOGRAM COMPLETE: CPT | Performed by: INTERNAL MEDICINE

## 2023-09-12 PROCEDURE — 99214 OFFICE O/P EST MOD 30 MIN: CPT | Performed by: INTERNAL MEDICINE

## 2023-09-12 RX ORDER — ORAL SEMAGLUTIDE 14 MG/1
14 TABLET ORAL DAILY
Qty: 30 TABLET | Refills: 2 | Status: SHIPPED | OUTPATIENT
Start: 2023-09-12

## 2023-09-12 RX ORDER — METOPROLOL SUCCINATE 25 MG/1
25 TABLET, EXTENDED RELEASE ORAL DAILY
Qty: 90 TABLET | Refills: 1 | Status: SHIPPED | OUTPATIENT
Start: 2023-09-12

## 2023-09-12 NOTE — TELEPHONE ENCOUNTER
[facility] [address]  Requested:      RYBELSUS 14MG Tablet   Take 1 tablet by mouth daily    #30/ 2 refills

## 2023-09-12 NOTE — PROGRESS NOTES
[facility] [place] [address]  Phone#  [phone]  Fax#  [phone]                                               Cardiology Office Follow up  Sarah Scruggs, 61 y.o. male  YOB: 1960  MRN: [id number] Encounter: [id number]      PCP - Benson Vo MD  Referring Provider - Benson Vo MD    No chief complaint on file. Assessment  Syncope  S/p EP study + ablation  Cardiomyopathy  TTE: 2/10/23 - Dyskinesis/hypokinesis of basal-mid septal/inferior walls - ?related to prior surgery  Nuclear Rx: 23 - LVEF 54%, stress ECG nondiagnostic due to resting ST-T changes, small perfusion defect in basal inferoseptal.inferior wall - possible artifact, no ischemia  s/p pericardiectomy (@[facility])  For infected pericardium  At age of 5 ()  Family history of early CAD  Brother was otherwise healthy, and suddenly needed CABG at 62  Father  suddenly of presumed heart attack  Diabetes Mellitus Type 2  A1c 7.2% (22)  CKD-2  Cr 1.35 in   Hyperlipidemia    Plan  Syncope, s/p EP study + ?ablation of unclear arrhythmia  Overview  23: had sudden syncope while sitting at table with family in [facility] --> evaluated at [facility] --> had intermittent tachycardia on telemetry, and was evaluated by EP --> EPS and ablation reportedly done (No actual reports available)  Subsequently had 1 month event monitor done --> no report available  23: no further dizziness, near syncope or syncope.  No palpitations  Plan  Reviewed available records and discharge summary from recent hospital stay, but his cardiac diagnosis and the actual nature of the ablation remains unclear to me  We will obtain further records, including follow up event monitor report from [facility] and the EP doctors that he saw in [facility], Family history of early CAD, s/p pericardiectomy  Overview  Initially presented due to wife's concerns about his family cardiac history  Father -  suddenly of presumed heart attack  Brother - had CABG after otherwise seemingly being health  No major chest pain/pressure shortness of breath  ECG: sinus tachycardia, possible Left atrial enlargement, poor R-wave progression - cannot r/o old anterior infarct  TTE: Dyskinesis/hypokinesis of basal-mid septal/inferior walls - ?related to prior surgery  Nuclear Rx: LVEF 54%, stress ECG nondiagnostic due to resting ST-T changes, small perfusion defect in basal inferoseptal.inferior wall - possible artifact, no ischemia  23: He continues to be free of chest pain or shortness of breath  2023: He was admitted with sudden syncope and needed EP study and ablation apparently in [place] last month.   He continues to be free of chest pain or shortness of breath  Impression  Small area of dyskinesis in inferoseptal/inferior wall, possibly related to prior cardiac surgery, although cannot completely exclude CAD  Plan  We will obtain records from [place]  With him being asymptomatic at present, will continue to optimize risk factors and lifestyle changes to reduce future risk of CAD  Avoid smoking, optimize diabetes control    Hyperlipidemia    Mainly had significant triglyceride elevations, but this has improved recently with uptitration of rosuvastatin  Tolerating rosuvastatin 20 mg daily  Continue same     Erythrocytosis  RBC count 5.6, Hb 16.9 in 2020 --> Hb 17.5 (2023)  Denies any recent smoking or smoke exposure  Follow up with PCP    Ascending aortic dilatation  Ascending aorta measured 4 cm on recent TTE  Optimize blood pressure control, cholesterol levels  Follow-up on echocardiogram next year    Results for orders placed or performed in visit on 23   POCT ECG    Impression    Normal sinus rhythm  Possible left atrial enlargement  Borderline ECG       Orders Placed This Encounter Procedures   • POCT ECG       Return in about 4 weeks (around 10/10/2023), or if symptoms worsen or fail to improve. History of Present Illness   61 y.o. male , who works as  at [employer], comes in as a new patient for consultation regarding cardiac risk stratification due to family history of early CAD. He reports no active or recent symptoms of chest pain or shortness of breath. No complaints of palpitations, dizziness or lightheadedness, near-syncope or syncope. No prior history of CAD or heart failure. He remains quite active with working and reports being able to go up 2 flights of stairs without any major symptoms. Recently his brother was also quite healthy suddenly needed bypass surgery, and this is got his wife very concerned. Additionally his father also has a history of dying suddenly in his 62s, and as a result he is here for cardiac risk stratification    He does report to me that he had a cardiac surgery at the age of 5, which involved stripping the entire covering around the heart due to an infection in that area. This was reportedly done at [facility] around 56. He had limited follow-up after that, but has not seen a cardiologist in many decades. Family history  Father -  in sleep at 72, of presumed heart attack  Mother - went for pre-op for knee replacement, found to have cardiomyopathy and very low EF. Paternal uncle -  at age 28 of "massive heart attack"  Brother - CABG at age 62    Interval history - 2023  He comes back for follow-up after about 3 months. In the interim he completed the echocardiogram, which showed signs of dyskinesis/wall motion noted in inferoseptal/inferior wall, and as result his stress test was switched to a nuclear stress test.  This did not reveal any significant evidence of ischemia or infarct, but there was an artifact.   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 Interval history - 9/12/2023  He comes back for follow-up after about 5 months. In the interim on 7/29/2023, he was in [place] with family sitting at the table when he suddenly passed out without any warning. He did not have any symptoms of chest pain, palpitations or dizziness before or after this. He was taken to [facility] in [place]. In the ED, he apparently had runs of tachycardia on telemetry associated with some dizziness and as a result was evaluated by electrophysiologist at the hospital.  He subsequently underwent EP study and reportedly had an ablation done. He was eventually discharged with a 1 month event monitor, which was also completed last month but he has not received any results regarding the same. He continues to feel well and is free of chest pain or shortness of breath.   No further complaints of dizziness or lightheadedness, near-syncope or syncope      Historical Information   Past Medical History:   Diagnosis Date   • Allergic rhinitis    • Carotid stenosis    • Colon polyps    • Degenerative joint disease    • Diabetes mellitus (HCC)    • Diverticulitis    • Glaucoma    • Gout    • Hyperlipidemia    • Kidney stones    • Obesity    • Pericarditis      Past Surgical History:   Procedure Laterality Date   • CARDIAC SURGERY     • CATARACT EXTRACTION Bilateral     Left eye 4/30/2021 and right eye 5/3/2021   • COLONOSCOPY     • RETINAL DETACHMENT SURGERY Left      Family History   Problem Relation Age of Onset   • Coronary artery disease Mother    • Diabetes Mother    • Arthritis Mother         Rheumatoid   • Heart attack Father    • Coronary artery disease Brother    • Heart attack Paternal Grandmother    • Heart attack Paternal Grandfather      Current Outpatient Medications on File Prior to Visit   Medication Sig Dispense Refill   • allopurinol (ZYLOPRIM) 300 mg tablet Take 1 tablet (300 mg total) by mouth daily 90 tablet 0   • aspirin 81 mg chewable tablet Daily     • Cholecalciferol (Vitamin D3) 1.25 MG (98702 UT) TABS Daily     • Empagliflozin (Jardiance) 25 MG TABS Take 1 tablet (25 mg total) by mouth daily after breakfast 30 tablet 3   • levothyroxine 25 mcg tablet Take 1 tablet (25 mcg total) by mouth daily 30 tablet 2   • losartan (COZAAR) 25 mg tablet Take 1 tablet (25 mg total) by mouth daily 30 tablet 3   • metFORMIN (GLUCOPHAGE) 1000 MG tablet Take 1 tablet (1,000 mg total) by mouth 2 (two) times a day with meals 180 tablet 0   • pantoprazole (PROTONIX) 40 mg tablet Take 40 mg by mouth daily     • rosuvastatin (CRESTOR) 20 MG tablet Take 1 tablet (20 mg total) by mouth daily 90 tablet 3     No current facility-administered medications on file prior to visit. No Known Allergies  Social History     Socioeconomic History   • Marital status: /Civil Union     Spouse name: None   • Number of children: None   • Years of education: None   • Highest education level: None   Occupational History   • None   Tobacco Use   • Smoking status: Never     Passive exposure: Past   • Smokeless tobacco: Never   Vaping Use   • Vaping Use: Never used   Substance and Sexual Activity   • Alcohol use: Not Currently   • Drug use: Never   • Sexual activity: None   Other Topics Concern   • None   Social History Narrative   • None     Social Determinants of Health     Financial Resource Strain: Not on file   Food Insecurity: Not on file   Transportation Needs: Not on file   Physical Activity: Not on file   Stress: Not on file   Social Connections: Not on file   Intimate Partner Violence: Not on file   Housing Stability: Not on file        Review of Systems   All other systems reviewed and are negative.         Vitals:  Vitals:    09/12/23 1430   BP: 126/70   Pulse: 64   SpO2: 96%   Weight: 92.5 kg (204 lb)   Height: 6' 2" (1.88 m)     BMI - Body mass index is 26.19 kg/m². Wt Readings from Last 7 Encounters:   09/12/23 92.5 kg (204 lb)   08/08/23 91.2 kg (201 lb)   05/18/23 94.7 kg (208 lb 12.8 oz)   04/27/23 94.3 kg (208 lb)   02/21/23 96.6 kg (213 lb)   02/14/23 97 kg (213 lb 12.8 oz)   02/10/23 95.3 kg (210 lb)       Physical Exam  Vitals and nursing note reviewed. Constitutional:       General: He is not in acute distress. Appearance: He is well-developed. He is not ill-appearing or diaphoretic. HENT:      Head: Normocephalic and atraumatic. Nose: No congestion. Eyes:      General: No scleral icterus. Conjunctiva/sclera: Conjunctivae normal.   Neck:      Vascular: No carotid bruit or JVD. Cardiovascular:      Rate and Rhythm: Normal rate and regular rhythm. Heart sounds: Normal heart sounds. No murmur heard. No friction rub. No gallop. Pulmonary:      Effort: Pulmonary effort is normal. No respiratory distress. Breath sounds: Normal breath sounds. No wheezing or rales. Chest:      Chest wall: No tenderness. Abdominal:      General: There is no distension. Palpations: Abdomen is soft. Tenderness: There is no abdominal tenderness. Musculoskeletal:         General: No swelling, tenderness or deformity. Cervical back: Neck supple. No muscular tenderness. Right lower leg: No edema. Left lower leg: No edema. Skin:     General: Skin is warm. Neurological:      General: No focal deficit present. Mental Status: He is alert and oriented to person, place, and time. Mental status is at baseline. Psychiatric:         Mood and Affect: Mood normal.         Behavior: Behavior normal.         Thought Content:  Thought content normal.           Labs:  CBC:   Lab Results   Component Value Date    WBC 6.34 05/17/2023    RBC 5.78 (H) 05/17/2023    HGB 17.5 (H) 05/17/2023    HCT 53.4 (H) 05/17/2023    MCV 92 05/17/2023     05/17/2023    RDW 12.7 05/17/2023       CMP:   Lab Results Component Value Date    K 3.9 02/08/2023     02/08/2023    CO2 25 02/08/2023    BUN 27 (H) 02/08/2023    CREATININE 1.17 02/08/2023    EGFR 66 02/08/2023    CALCIUM 9.1 02/08/2023    AST 29 09/05/2020    ALT 27 09/05/2020    ALKPHOS 85.7 09/05/2020       Magnesium:  No results found for: "MG"    Lipid Profile:   Lab Results   Component Value Date    HDL 38 (L) 05/17/2023    TRIG 116 05/17/2023    LDLCALC 76 05/17/2023       Thyroid Studies: No results found for: "[id number]", "T3FREE", "Charlene Romina", "M1GODLR", "N1PBCPO"    A1c:  No components found for: "HGA1C"    INR:  No results found for: "INR"5    Imaging: No results found. Cardiac testing:   No results found for this or any previous visit. No results found for this or any previous visit. No results found for this or any previous visit. No results found for this or any previous visit.       Stress strip  Confirmed by SANFORD BENSON ([id number]),  Sarkis Benson ([id number]) on 2/22/2023 9:12:00 AM

## 2023-09-14 ENCOUNTER — TELEPHONE (OUTPATIENT)
Dept: CARDIOLOGY CLINIC | Facility: CLINIC | Age: 63
End: 2023-09-14

## 2023-09-14 NOTE — TELEPHONE ENCOUNTER
Pt filled out medical release for [facility] to fax office notes/results. Fax was sent to [phone] and confirmation was received.

## 2023-09-18 ENCOUNTER — OFFICE VISIT (OUTPATIENT)
Dept: FAMILY MEDICINE CLINIC | Facility: CLINIC | Age: 63
End: 2023-09-18
Payer: COMMERCIAL

## 2023-09-18 VITALS
WEIGHT: 206.8 LBS | RESPIRATION RATE: 16 BRPM | TEMPERATURE: 98.1 F | HEART RATE: 86 BPM | SYSTOLIC BLOOD PRESSURE: 138 MMHG | BODY MASS INDEX: 26.54 KG/M2 | OXYGEN SATURATION: 97 % | DIASTOLIC BLOOD PRESSURE: 82 MMHG | HEIGHT: 74 IN

## 2023-09-18 DIAGNOSIS — I10 HTN (HYPERTENSION), BENIGN: ICD-10-CM

## 2023-09-18 DIAGNOSIS — E11.9 TYPE 2 DIABETES MELLITUS WITHOUT COMPLICATION, WITHOUT LONG-TERM CURRENT USE OF INSULIN (HCC): ICD-10-CM

## 2023-09-18 DIAGNOSIS — Z00.00 ANNUAL PHYSICAL EXAM: Primary | ICD-10-CM

## 2023-09-18 PROCEDURE — 99396 PREV VISIT EST AGE 40-64: CPT

## 2023-09-18 RX ORDER — LOSARTAN POTASSIUM 25 MG/1
25 TABLET ORAL DAILY
Qty: 30 TABLET | Refills: 3 | Status: SHIPPED | OUTPATIENT
Start: 2023-09-18

## 2023-09-18 NOTE — PROGRESS NOTES
[id number] [facility] PRIMARY CARE    NAME: Dakota Lamb  AGE: 61 y.o. SEX: male  : 1960     DATE: 2023     Assessment and Plan:     Problem List Items Addressed This Visit        Endocrine    Type 2 diabetes mellitus without complication, without long-term current use of insulin ([address])       Lab Results   Component Value Date    HGBA1C 7.2 (H) 2023            Relevant Medications    Empagliflozin (Jardiance) 25 MG TABS    Other Relevant Orders    Basic metabolic panel    Hemoglobin A1C       Cardiovascular and Mediastinum    HTN (hypertension), benign    Relevant Medications    losartan (COZAAR) 25 mg tablet   Other Visit Diagnoses     Annual physical exam    -  Primary          Immunizations and preventive care screenings were discussed with patient today. Appropriate education was printed on patient's after visit summary. Discussed risks and benefits of prostate cancer screening. We discussed the controversial history of PSA screening for prostate cancer in the [facility] as well as the risk of over detection and over treatment of prostate cancer by way of PSA screening. The patient understands that PSA blood testing is an imperfect way to screen for prostate cancer and that elevated PSA levels in the blood may also be caused by infection, inflammation, prostatic trauma or manipulation, urological procedures, or by benign prostatic enlargement. The role of the digital rectal examination in prostate cancer screening was also discussed and I discussed with him that there is large interobserver variability in the findings of digital rectal examination. Counseling:  Exercise: the importance of regular exercise/physical activity was discussed. Recommend exercise 3-5 times per week for at least 30 minutes. BMI Counseling: Body mass index is 26.55 kg/m².  The BMI is above normal. Nutrition recommendations include decreasing portion sizes, decreasing fast food intake, limiting drinks that contain sugar, moderation in carbohydrate intake, increasing intake of lean protein and reducing intake of saturated and trans fat. Exercise recommendations include vigorous physical activity 75 minutes/week. No pharmacotherapy was ordered. Rationale for BMI follow-up plan is due to patient being overweight or obese. Return in about 3 months (around 12/18/2023). Chief Complaint:     Chief Complaint   Patient presents with   • Physical Exam     Physical Exam      History of Present Illness:     Adult Annual Physical   Patient here for a comprehensive physical exam. The patient reports no problems. Diet and Physical Activity  Diet/Nutrition: well balanced diet, diabetic diet, consuming 3-5 servings of fruits/vegetables daily and adequate fiber intake. Exercise: walking. Depression Screening  PHQ-2/9 Depression Screening         General Health  Sleep: sleeps well. Hearing: normal - bilateral.  Vision: vision problems: visual disturbances has been followed by a retinologist.   Dental: regular dental visits, brushes teeth twice daily and does not floss.  Health  Symptoms include: none     Review of Systems:     Review of Systems   Constitutional: Negative for chills, fatigue and fever. HENT: Negative for congestion, ear pain, rhinorrhea, sneezing and sore throat. Eyes: Positive for visual disturbance. Negative for pain, discharge, redness and itching. Respiratory: Negative for cough, chest tightness and shortness of breath. Cardiovascular: Negative for chest pain, palpitations and leg swelling. Gastrointestinal: Negative for abdominal pain, blood in stool, diarrhea, nausea and vomiting. Endocrine: Negative for cold intolerance, heat intolerance, polydipsia, polyphagia and polyuria. Genitourinary: Negative for dysuria, frequency, hematuria and urgency.    Musculoskeletal: Negative for arthralgias, back pain and myalgias. Skin: Positive for wound (Laceration with stitches left forehead above the eyebrow). Negative for color change and rash. Neurological: Negative for dizziness, weakness, light-headedness, numbness and headaches. Hematological: Does not bruise/bleed easily. Psychiatric/Behavioral: Negative for agitation, behavioral problems and confusion.       Past Medical History:     Past Medical History:   Diagnosis Date   • Allergic rhinitis    • Carotid stenosis    • Colon polyps    • Degenerative joint disease    • Diabetes mellitus (HCC)    • Diverticulitis    • Glaucoma    • Gout    • Hyperlipidemia    • Kidney stones    • Obesity    • Pericarditis       Past Surgical History:     Past Surgical History:   Procedure Laterality Date   • CARDIAC SURGERY     • CATARACT EXTRACTION Bilateral     Left eye 4/30/2021 and right eye 5/3/2021   • COLONOSCOPY     • RETINAL DETACHMENT SURGERY Left       Family History:     Family History   Problem Relation Age of Onset   • Coronary artery disease Mother    • Diabetes Mother    • Arthritis Mother         Rheumatoid   • Heart attack Father    • Coronary artery disease Brother    • Heart attack Paternal Grandmother    • Heart attack Paternal Grandfather       Social History:     Social History     Socioeconomic History   • Marital status: /Civil Union     Spouse name: None   • Number of children: None   • Years of education: None   • Highest education level: None   Occupational History   • None   Tobacco Use   • Smoking status: Never     Passive exposure: Past   • Smokeless tobacco: Never   Vaping Use   • Vaping Use: Never used   Substance and Sexual Activity   • Alcohol use: Not Currently   • Drug use: Never   • Sexual activity: None   Other Topics Concern   • None   Social History Narrative   • None     Social Determinants of Health     Financial Resource Strain: Not on file   Food Insecurity: Not on file   Transportation Needs: Not on file   Physical Activity: Not on file   Stress: Not on file   Social Connections: Not on file   Intimate Partner Violence: Not on file   Housing Stability: Not on file      Current Medications:     Current Outpatient Medications   Medication Sig Dispense Refill   • allopurinol (ZYLOPRIM) 300 mg tablet Take 1 tablet (300 mg total) by mouth daily 90 tablet 0   • aspirin 81 mg chewable tablet Daily     • Cholecalciferol (Vitamin D3) 1.25 MG (02880 UT) TABS Daily     • Empagliflozin (Jardiance) 25 MG TABS Take 1 tablet (25 mg total) by mouth daily after breakfast 30 tablet 3   • levothyroxine 25 mcg tablet Take 1 tablet (25 mcg total) by mouth daily 30 tablet 2   • losartan (COZAAR) 25 mg tablet Take 1 tablet (25 mg total) by mouth daily 30 tablet 3   • metFORMIN (GLUCOPHAGE) 1000 MG tablet Take 1 tablet (1,000 mg total) by mouth 2 (two) times a day with meals 180 tablet 0   • metoprolol succinate (TOPROL-XL) 25 mg 24 hr tablet Take 1 tablet (25 mg total) by mouth daily 90 tablet 1   • rosuvastatin (CRESTOR) 20 MG tablet Take 1 tablet (20 mg total) by mouth daily 90 tablet 3   • Rybelsus 14 MG tablet Take 1 tablet (14 mg total) by mouth daily 30 tablet 2     No current facility-administered medications for this visit. Allergies:     No Known Allergies   Physical Exam:     /82 (BP Location: Left arm, Patient Position: Sitting, Cuff Size: Standard)   Pulse 86   Temp 98.1 °F (36.7 °C) (Temporal)   Resp 16   Ht 6' 2" (1.88 m)   Wt 93.8 kg (206 lb 12.8 oz)   SpO2 97%   BMI 26.55 kg/m²     Physical Exam  Vitals and nursing note reviewed. Constitutional:       General: He is not in acute distress. Appearance: Normal appearance. He is well-developed and normal weight. He is not ill-appearing, toxic-appearing or diaphoretic. HENT:      Head: Normocephalic and atraumatic. Right Ear: Tympanic membrane normal. There is no impacted cerumen. Left Ear: Tympanic membrane normal. There is no impacted cerumen. Nose: Nose normal. No congestion or rhinorrhea. Mouth/Throat:      Mouth: Mucous membranes are moist.      Pharynx: Oropharynx is clear. Eyes:      General: No scleral icterus. Right eye: No discharge. Left eye: No discharge. Extraocular Movements: Extraocular movements intact. Conjunctiva/sclera: Conjunctivae normal.      Pupils: Pupils are equal, round, and reactive to light. Cardiovascular:      Rate and Rhythm: Normal rate and regular rhythm. Pulses: Normal pulses. Heart sounds: Normal heart sounds. No murmur heard. Pulmonary:      Effort: Pulmonary effort is normal. No respiratory distress. Breath sounds: Normal breath sounds. No wheezing. Abdominal:      General: Abdomen is flat. Bowel sounds are normal. There is no distension. Palpations: Abdomen is soft. Tenderness: There is no abdominal tenderness. There is no right CVA tenderness or left CVA tenderness. Musculoskeletal:         General: Normal range of motion. Cervical back: Normal range of motion and neck supple. Right lower leg: No edema. Left lower leg: No edema. Skin:     General: Skin is warm and dry. Capillary Refill: Capillary refill takes 2 to 3 seconds. Coloration: Skin is not jaundiced. Neurological:      General: No focal deficit present. Mental Status: He is alert and oriented to person, place, and time. Mental status is at baseline. Motor: No weakness.    Psychiatric:         Mood and Affect: Mood normal.         Behavior: Behavior normal.          Matthew Han MD  [address]

## 2023-10-19 DIAGNOSIS — Z87.39 HISTORY OF GOUT: ICD-10-CM

## 2023-10-19 DIAGNOSIS — E11.9 TYPE 2 DIABETES MELLITUS WITHOUT COMPLICATION, WITHOUT LONG-TERM CURRENT USE OF INSULIN (HCC): ICD-10-CM

## 2023-10-19 RX ORDER — ALLOPURINOL 300 MG/1
300 TABLET ORAL DAILY
Qty: 90 TABLET | Refills: 0 | Status: SHIPPED | OUTPATIENT
Start: 2023-10-19

## 2023-10-19 NOTE — TELEPHONE ENCOUNTER
Fax received from [address] [place] [address] for refills on Metformin HCL 1000 mg and Allopurinol 300 mg, routed to [facility] [employer]

## 2023-10-20 ENCOUNTER — OFFICE VISIT (OUTPATIENT)
Dept: CARDIOLOGY CLINIC | Facility: CLINIC | Age: 63
End: 2023-10-20

## 2023-10-20 VITALS
BODY MASS INDEX: 26.31 KG/M2 | WEIGHT: 205 LBS | SYSTOLIC BLOOD PRESSURE: 114 MMHG | OXYGEN SATURATION: 98 % | HEIGHT: 74 IN | HEART RATE: 99 BPM | DIASTOLIC BLOOD PRESSURE: 70 MMHG

## 2023-10-20 DIAGNOSIS — R55 SYNCOPE, UNSPECIFIED SYNCOPE TYPE: Primary | ICD-10-CM

## 2023-10-20 DIAGNOSIS — I47.29 NSVT (NONSUSTAINED VENTRICULAR TACHYCARDIA) (HCC): ICD-10-CM

## 2023-10-20 DIAGNOSIS — I42.9 CARDIOMYOPATHY, UNSPECIFIED TYPE (HCC): ICD-10-CM

## 2023-10-20 DIAGNOSIS — Z82.49 FAMILY HISTORY OF EARLY CAD: ICD-10-CM

## 2023-10-20 DIAGNOSIS — R94.39 EQUIVOCAL STRESS TEST: ICD-10-CM

## 2023-10-20 NOTE — PROGRESS NOTES
[facility] [place] [address]  Phone#  [phone]  Fax#  [phone]                                               Cardiology Office Follow up  Luda Sweet, 61 y.o. male  YOB: 1960  MRN: [id number] Encounter: [id number]      PCP - Josue Pitts MD  Referring Provider - No ref. 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   Cardiomyopathy  TTE: 2/10/23 - Dyskinesis/hypokinesis of basal-mid septal/inferior walls - ?related to prior surgery  Nuclear Rx: 23 - LVEF 54%, stress ECG nondiagnostic due to resting ST-T changes, small perfusion defect in basal inferoseptal.inferior wall - possible artifact, no ischemia  s/p pericardiectomy (@[facility])  For infected pericardium  At age of 5 ()  Family history of early CAD  Brother was otherwise healthy, and suddenly needed CABG at 62  Father  suddenly of presumed heart attack  Diabetes Mellitus Type 2  A1c 7.2% (22)  CKD-2  Cr 1.35 in   Hyperlipidemia    Plan  Syncope, PSVT s/p AVNRT ablation, NSVT, PACs  Overview  23: had sudden syncope while sitting at table with family in [facility] --> evaluated [facility] --> had intermittent tachycardia on telemetry, and was evaluated by EP --> EPS and ablation reportedly done (No actual reports available)  Subsequently had 1 month event monitor done --> no report available  23: no further dizziness, near syncope or syncope. No palpitations  10/19/23: Records obtained from [facility] -reviewed today with patient. He had SVT/AVNRT, which was felt to be symptomatic and causing him to be dizzy by EP. So he underwent slow pathway ablation for the AVNRT.   He subsequently had a 1 month event monitor done, which did not reveal any further SVT. It did show one 8 beat run of NSVT on 2023 at 2:30 PM, although there were no symptoms reported with it. Plan  No further dizziness lightheadedness, palpitations or any symptoms  No further SVT on 1 month event monitor in 2023  He does have NSVT, and is stress test was equivocal earlier this year and he has mildly reduced EF --> with these recent events, and the NSVT on his monitor, I recommend further evaluation with a cardiac CTA to clarify underlying CAD  Continue metoprolol succinate 25 mg daily and plan to uptitrate  Minimize caffeine, alcohol, avoid smoking          Cardiomyopathy, Family history of early CAD, s/p pericardiectomy, NSVT  Overview  Initially presented due to wife's concerns about his family cardiac history  Father -  suddenly of presumed heart attack  Brother - had CABG after otherwise seemingly being health  No major chest pain/pressure shortness of breath  ECG: sinus tachycardia, possible Left atrial enlargement, poor R-wave progression - cannot r/o old anterior infarct  TTE: Dyskinesis/hypokinesis of basal-mid septal/inferior walls - ?related to prior surgery  Nuclear Rx: 2023: LVEF 54%, stress ECG nondiagnostic due to resting ST-T changes, small perfusion defect in basal inferoseptal.inferior wall - possible artifact v ischemia  23: He continues to be free of chest pain or shortness of breath  2023: He was admitted with sudden syncope and needed EP study and ablation apparently in [place] last month.   He continues to be free of chest pain or shortness of breath  10/2023: no major chest pain or shortness of breath, but he is having NSVT and has strong family h/o early CAD,  Impression  Small area of dyskinesis in inferoseptal/inferior wall, possibly related to prior cardiac surgery v CAD  Plan  Recommend further evaluation with cardiac CTA to define underlying coronary artery disease  Optimize diabetes control  Monitor for symptoms and let me know if any new chest pain, shortness of breath or palpitations  Continue aspirin, metoprolol, rosuvastatin    Hyperlipidemia    Mainly had significant triglyceride elevations, but this has improved recently with uptitration of rosuvastatin  Tolerating rosuvastatin 20 mg daily  Continue same     Ascending aortic dilatation  Ascending aorta measured 4 cm on recent TTE  Optimize blood pressure control, cholesterol levels  Follow-up on upcoming Cardiac CTA and then echocardiogram next year    No results found for this visit on 10/20/23. Orders Placed This Encounter   Procedures   • CTA cardiac   • Basic metabolic panel         Return in about 4 months (around 2024), or if symptoms worsen or fail to improve. History of Present Illness   61 y.o. male , who works as  at [employer], comes in as a new patient for consultation regarding cardiac risk stratification due to family history of early CAD. He reports no active or recent symptoms of chest pain or shortness of breath. No complaints of palpitations, dizziness or lightheadedness, near-syncope or syncope. No prior history of CAD or heart failure. He remains quite active with working and reports being able to go up 2 flights of stairs without any major symptoms. Recently his brother was also quite healthy suddenly needed bypass surgery, and this is got his wife very concerned. Additionally his father also has a history of dying suddenly in his 62s, and as a result he is here for cardiac risk stratification    He does report to me that he had a cardiac surgery at the age of 5, which involved stripping the entire covering around the heart due to an infection in that area. This was reportedly done at [facility] around [address] [place] [address]. He had limited follow-up after that, but has not seen a cardiologist in many decades.     Family history  Father -  in sleep at 72, of presumed heart attack  Mother - went for pre-op for knee replacement, found to have cardiomyopathy and very low EF. Paternal uncle -  at age 28 of "massive heart attack"  Brother - CABG at age 62    Interval history - 2023  He comes back for follow-up after about 3 months. In the interim he completed the echocardiogram, which showed signs of dyskinesis/wall motion noted in inferoseptal/inferior wall, and as result his stress test was switched to a nuclear stress test.  This did not reveal any significant evidence of ischemia or infarct, but there was an artifact. 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 Interval history - 2023  He comes back for follow-up after about 5 months. In the interim on 2023, he was in [place] with family sitting at the table when he suddenly passed out without any warning. He did not have any symptoms of chest pain, palpitations or dizziness before or after this. He was taken to [facility] in [place]. In the ED, he apparently had runs of tachycardia on telemetry associated with some dizziness and as a result was evaluated by electrophysiologist at the hospital.  He subsequently underwent EP study and reportedly had an ablation done. He was eventually discharged with a 1 month event monitor, which was also completed last month but he has not received any results regarding the same. He continues to feel well and is free of chest pain or shortness of breath. No further complaints of dizziness or lightheadedness, near-syncope or syncope    Interval history - 10/20/2023  He returns for follow-up after 1 month. In the interim, records obtained from [facility] and were reviewed today with patient. He had SVT/AVNRT, which was felt to be symptomatic and causing him to be dizzy by EP.   So he underwent slow pathway ablation for the AVNRT. He subsequently had a 1 month event monitor done, which did not reveal any further SVT. It did show one 8 beat run of NSVT on 8/8/2023 at 2:30 PM, although there were no symptoms reported with it. Over the last 2 months he has not had any further dizziness or lightheadedness, near-syncope or syncope. He remains free of chest pain or any major shortness of breath, although does not exercise. 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       Historical Information   Past Medical History:   Diagnosis Date   • Allergic rhinitis    • Carotid stenosis    • Colon polyps    • Degenerative joint disease    • Diabetes mellitus ([address])    • Diverticulitis    • Glaucoma    • Gout    • Hyperlipidemia    • Kidney stones    • Obesity    • Pericarditis      Past Surgical History:   Procedure Laterality Date   • CARDIAC SURGERY     • CATARACT EXTRACTION Bilateral     Left eye 4/30/2021 and right eye 5/3/2021   • COLONOSCOPY     • RETINAL DETACHMENT SURGERY Left      Family History   Problem Relation Age of Onset   • Coronary artery disease Mother    • Diabetes Mother    • Arthritis Mother         Rheumatoid   • Heart attack Father    • Coronary artery disease Brother    • Heart attack Paternal Grandmother    • Heart attack Paternal Grandfather      Current Outpatient Medications on File Prior to Visit   Medication Sig Dispense Refill   • allopurinol (ZYLOPRIM) 300 mg tablet Take 1 tablet (300 mg total) by mouth daily 90 tablet 0   • aspirin 81 mg chewable tablet Daily     • Cholecalciferol (Vitamin D3) 1.25 MG (82023 UT) TABS Daily     • Empagliflozin (Jardiance) 25 MG TABS Take 1 tablet (25 mg total) by mouth daily after breakfast 30 tablet 3   • levothyroxine 25 mcg tablet Take 1 tablet (25 mcg total) by mouth daily 30 tablet 2   • losartan (COZAAR) 25 mg tablet Take 1 tablet (25 mg total) by mouth daily 30 tablet 3   • metFORMIN (GLUCOPHAGE) 1000 MG tablet Take 1 tablet (1,000 mg total) by mouth 2 (two) times a day with meals 180 tablet 0   • metoprolol succinate (TOPROL-XL) 25 mg 24 hr tablet Take 1 tablet (25 mg total) by mouth daily 90 tablet 1   • rosuvastatin (CRESTOR) 20 MG tablet Take 1 tablet (20 mg total) by mouth daily 90 tablet 3   • Rybelsus 14 MG tablet Take 1 tablet (14 mg total) by mouth daily 30 tablet 2     No current facility-administered medications on file prior to visit. No Known Allergies  Social History     Socioeconomic History   • Marital status: /Civil Union     Spouse name: None   • Number of children: None   • Years of education: None   • Highest education level: None   Occupational History   • None   Tobacco Use   • Smoking status: Never     Passive exposure: Past   • Smokeless tobacco: Never   Vaping Use   • Vaping Use: Never used   Substance and Sexual Activity   • Alcohol use: Not Currently   • Drug use: Never   • Sexual activity: None   Other Topics Concern   • None   Social History Narrative   • None     Social Determinants of Health     Financial Resource Strain: Not on file   Food Insecurity: Not on file   Transportation Needs: Not on file   Physical Activity: Not on file   Stress: Not on file   Social Connections: Not on file   Intimate Partner Violence: Not on file   Housing Stability: Not on file        Review of Systems   All other systems reviewed and are negative. Vitals:  Vitals:    10/20/23 1435   BP: 114/70   BP Location: Left arm   Patient Position: Sitting   Cuff Size: Standard   Pulse: 99   SpO2: 98%   Weight: 93 kg (205 lb)   Height: 6' 2" (1.88 m)     BMI - Body mass index is 26.32 kg/m².   Wt Readings from Last 7 Encounters:   10/20/23 93 kg (205 lb)   09/18/23 93.8 kg (206 lb 12.8 oz)   09/12/23 92.5 kg (204 lb)   08/08/23 91.2 kg (201 lb)   05/18/23 94.7 kg (208 lb 12.8 oz)   04/27/23 94.3 kg (208 lb)   02/21/23 96.6 kg (213 lb)       Physical Exam  Vitals and nursing note reviewed. Constitutional:       General: He is not in acute distress. Appearance: He is well-developed. He is not ill-appearing or diaphoretic. HENT:      Head: Normocephalic and atraumatic. Nose: No congestion. Eyes:      General: No scleral icterus. Conjunctiva/sclera: Conjunctivae normal.   Neck:      Vascular: No carotid bruit or JVD. Cardiovascular:      Rate and Rhythm: Normal rate and regular rhythm. Heart sounds: Normal heart sounds. No murmur heard. No friction rub. No gallop. Pulmonary:      Effort: Pulmonary effort is normal. No respiratory distress. Breath sounds: Normal breath sounds. No wheezing or rales. Chest:      Chest wall: No tenderness. Abdominal:      General: There is no distension. Palpations: Abdomen is soft. Tenderness: There is no abdominal tenderness. Musculoskeletal:         General: No swelling, tenderness or deformity. Cervical back: Neck supple. No muscular tenderness. Right lower leg: No edema. Left lower leg: No edema. Skin:     General: Skin is warm. Neurological:      General: No focal deficit present. Mental Status: He is alert and oriented to person, place, and time. Mental status is at baseline. Psychiatric:         Mood and Affect: Mood normal.         Behavior: Behavior normal.         Thought Content:  Thought content normal.           Labs:  CBC:   Lab Results   Component Value Date    WBC 6.34 05/17/2023    RBC 5.78 (H) 05/17/2023    HGB 17.5 (H) 05/17/2023    HCT 53.4 (H) 05/17/2023    MCV 92 05/17/2023     05/17/2023    RDW 12.7 05/17/2023       CMP:   Lab Results   Component Value Date    K 3.9 02/08/2023     02/08/2023    CO2 25 02/08/2023    BUN 27 (H) 02/08/2023    CREATININE 1.17 02/08/2023    EGFR 66 02/08/2023    CALCIUM 9.1 02/08/2023    AST 29 09/05/2020    ALT 27 09/05/2020    ALKPHOS 85.7 09/05/2020       Magnesium:  No results found for: "MG"    Lipid Profile:   Lab Results   Component Value Date    HDL 38 (L) 05/17/2023    TRIG 116 05/17/2023    LDLCALC 76 05/17/2023       Thyroid Studies: No results found for: "[id number]", "T3FREE", "Arvid Lava", "X6HZKFF", "X9FQSGR"    A1c:  No components found for: "HGA1C"    INR:  No results found for: "INR"5    Imaging: No results found. Cardiac testing:   No results found for this or any previous visit. No results found for this or any previous visit. No results found for this or any previous visit. No results found for this or any previous visit.       Stress strip  Confirmed by SANFORD BENSON ([id number]),  Kyle Galdamez ([id number]) on 2/22/2023 9:12:00 AM

## 2023-11-11 ENCOUNTER — APPOINTMENT (OUTPATIENT)
Dept: LAB | Facility: MEDICAL CENTER | Age: 63
End: 2023-11-11
Payer: COMMERCIAL

## 2023-11-11 DIAGNOSIS — R94.39 EQUIVOCAL STRESS TEST: ICD-10-CM

## 2023-11-11 DIAGNOSIS — R35.1 NOCTURIA: ICD-10-CM

## 2023-11-11 DIAGNOSIS — I47.29 NSVT (NONSUSTAINED VENTRICULAR TACHYCARDIA) (HCC): ICD-10-CM

## 2023-11-11 DIAGNOSIS — R55 SYNCOPE, UNSPECIFIED SYNCOPE TYPE: ICD-10-CM

## 2023-11-11 LAB
ANION GAP SERPL CALCULATED.3IONS-SCNC: 7 MMOL/L
BUN SERPL-MCNC: 28 MG/DL (ref 5–25)
CALCIUM SERPL-MCNC: 10.4 MG/DL (ref 8.4–10.2)
CHLORIDE SERPL-SCNC: 104 MMOL/L (ref 96–108)
CO2 SERPL-SCNC: 27 MMOL/L (ref 21–32)
CREAT SERPL-MCNC: 1.17 MG/DL (ref 0.6–1.3)
GFR SERPL CREATININE-BSD FRML MDRD: 65 ML/MIN/1.73SQ M
GLUCOSE P FAST SERPL-MCNC: 140 MG/DL (ref 65–99)
POTASSIUM SERPL-SCNC: 4.6 MMOL/L (ref 3.5–5.3)
PSA SERPL-MCNC: 0.91 NG/ML (ref 0–4)
SODIUM SERPL-SCNC: 138 MMOL/L (ref 135–147)

## 2023-11-11 PROCEDURE — 80048 BASIC METABOLIC PNL TOTAL CA: CPT

## 2023-11-11 PROCEDURE — G0103 PSA SCREENING: HCPCS

## 2023-11-11 PROCEDURE — 36415 COLL VENOUS BLD VENIPUNCTURE: CPT

## 2023-11-13 DIAGNOSIS — E03.9 ACQUIRED HYPOTHYROIDISM: ICD-10-CM

## 2023-11-13 RX ORDER — LEVOTHYROXINE SODIUM 0.03 MG/1
25 TABLET ORAL DAILY
Qty: 30 TABLET | Refills: 2 | Status: SHIPPED | OUTPATIENT
Start: 2023-11-13

## 2023-11-15 ENCOUNTER — TELEPHONE (OUTPATIENT)
Dept: CARDIOLOGY CLINIC | Facility: CLINIC | Age: 63
End: 2023-11-15

## 2023-11-15 NOTE — TELEPHONE ENCOUNTER
Dee from [facility] in [facility] called and asked for increase on patients beta blocker , due to him having a CTA scheduled on 12/7/2023.  Pulse target for CTA  is 60 and pulse was previously 99. Please advise if okay to increase toprol.

## 2023-11-16 NOTE — TELEPHONE ENCOUNTER
Relayed message as given per Dr. Malcolm to patients wife. And also LMOM notifying renea the nurse from [facility] of this as well.

## 2023-11-28 ENCOUNTER — HOSPITAL ENCOUNTER (EMERGENCY)
Facility: HOSPITAL | Age: 63
Discharge: HOME | End: 2023-11-28
Attending: EMERGENCY MEDICINE
Payer: COMMERCIAL

## 2023-11-28 VITALS
OXYGEN SATURATION: 99 % | WEIGHT: 205 LBS | SYSTOLIC BLOOD PRESSURE: 144 MMHG | TEMPERATURE: 97.7 F | BODY MASS INDEX: 26.32 KG/M2 | HEART RATE: 91 BPM | RESPIRATION RATE: 18 BRPM | DIASTOLIC BLOOD PRESSURE: 81 MMHG

## 2023-11-28 DIAGNOSIS — R04.0 LEFT-SIDED EPISTAXIS: Primary | ICD-10-CM

## 2023-11-28 LAB
HCT VFR BLD AUTO: 45.7 % (ref 36.5–49.3)
HGB BLD-MCNC: 15 G/DL (ref 12–17)

## 2023-11-28 PROCEDURE — 85014 HEMATOCRIT: CPT | Performed by: EMERGENCY MEDICINE

## 2023-11-28 PROCEDURE — 36415 COLL VENOUS BLD VENIPUNCTURE: CPT | Performed by: EMERGENCY MEDICINE

## 2023-11-28 PROCEDURE — 99283 EMERGENCY DEPT VISIT LOW MDM: CPT

## 2023-11-28 PROCEDURE — 85018 HEMOGLOBIN: CPT | Performed by: EMERGENCY MEDICINE

## 2023-11-28 PROCEDURE — 99285 EMERGENCY DEPT VISIT HI MDM: CPT | Performed by: EMERGENCY MEDICINE

## 2023-11-28 RX ORDER — OXYMETAZOLINE HYDROCHLORIDE 0.05 G/100ML
2 SPRAY NASAL ONCE
Status: COMPLETED | OUTPATIENT
Start: 2023-11-28 | End: 2023-11-28

## 2023-11-28 RX ADMIN — OXYMETAZOLINE HYDROCHLORIDE 2 SPRAY: 0.05 SPRAY NASAL at 07:32

## 2023-11-28 NOTE — DISCHARGE INSTRUCTIONS
If your nosebleeds again. Spray the Afrin or Wolfgang-Synephrine in your nostril. Apply the clamp for 30 minutes. If still bleeding after this come to the emergency department. Follow-up with ear nose throat at the contact information below. Add a humidifier to your house. Do not put anything in your nose for the next 24 hours.

## 2023-11-28 NOTE — ED PROVIDER NOTES
History  Chief Complaint   Patient presents with    Nose Bleed     Pt reports left nares bleeding since last night. Takes a baby asa.     62 y/o male PMH of carotid stenosis, DM, diverticulitis, HLD, PAF, kidney stones, pericarditis, cardiac surgery on 81mg asa daily. Presents for nose bleed. Notes a long history of left nasal bleeds. Decades ago he required packing of the left nostril. He does not follow-up with an ear nose throat doctor. He reports that yesterday evening he started with nasal bleeding. He is attempted Wolfgang-Synephrine without relief of bleeding. He is placed a tissue in the nostril without relief of bleeding. He has not applied compression. He reports feeling slightly woozy. No shortness of breath. No other symptoms. Nose Bleed  Location:  L nare  Severity:  Moderate  Duration:  1 day  Timing:  Constant  Progression:  Unchanged  Chronicity:  Recurrent  Context: aspirin use    Relieved by:  Nothing  Worsened by:  Nothing  Ineffective treatments: wolfgang-synephrine. Associated symptoms comment:  Woozy      Prior to Admission Medications   Prescriptions Last Dose Informant Patient Reported? Taking?    Cholecalciferol (Vitamin D3) 1.25 MG (61527 UT) TABS  Self Yes No   Sig: Daily   Empagliflozin (Jardiance) 25 MG TABS   No No   Sig: Take 1 tablet (25 mg total) by mouth daily after breakfast   Rybelsus 14 MG tablet   No No   Sig: Take 1 tablet (14 mg total) by mouth daily   allopurinol (ZYLOPRIM) 300 mg tablet   No No   Sig: Take 1 tablet (300 mg total) by mouth daily   aspirin 81 mg chewable tablet  Self Yes No   Sig: Daily   levothyroxine 25 mcg tablet   No No   Sig: Take 1 tablet (25 mcg total) by mouth daily   losartan (COZAAR) 25 mg tablet   No No   Sig: Take 1 tablet (25 mg total) by mouth daily   metFORMIN (GLUCOPHAGE) 1000 MG tablet   No No   Sig: Take 1 tablet (1,000 mg total) by mouth 2 (two) times a day with meals   metoprolol succinate (TOPROL-XL) 25 mg 24 hr tablet   No No Sig: Take 1 tablet (25 mg total) by mouth daily   rosuvastatin (CRESTOR) 20 MG tablet   No No   Sig: Take 1 tablet (20 mg total) by mouth daily      Facility-Administered Medications: None       Past Medical History:   Diagnosis Date    Allergic rhinitis     Carotid stenosis     Colon polyps     Degenerative joint disease     Diabetes mellitus (HCC)     Diverticulitis     Glaucoma     Gout     Hyperlipidemia     Kidney stones     Obesity     Pericarditis     SVT (supraventricular tachycardia)     possibly or afib       Past Surgical History:   Procedure Laterality Date    CARDIAC SURGERY      CATARACT EXTRACTION Bilateral     Left eye 4/30/2021 and right eye 5/3/2021    COLONOSCOPY      RETINAL DETACHMENT SURGERY Left        Family History   Problem Relation Age of Onset    Coronary artery disease Mother     Diabetes Mother     Arthritis Mother         Rheumatoid    Heart attack Father     Coronary artery disease Brother     Heart attack Paternal Grandmother     Heart attack Paternal Grandfather      I have reviewed and agree with the history as documented. E-Cigarette/Vaping    E-Cigarette Use Never User      E-Cigarette/Vaping Substances    Nicotine No     THC No     CBD No     Flavoring No     Other No     Unknown No      Social History     Tobacco Use    Smoking status: Never     Passive exposure: Past    Smokeless tobacco: Never   Vaping Use    Vaping Use: Never used   Substance Use Topics    Alcohol use: Not Currently    Drug use: Never       Review of Systems   HENT:  Positive for nosebleeds. Neurological:         "Woozy"   All other systems reviewed and are negative. Physical Exam  Physical Exam  Vitals and nursing note reviewed. Constitutional:       General: He is not in acute distress. Appearance: He is well-developed. He is not diaphoretic. HENT:      Head: Normocephalic and atraumatic.       Right Ear: External ear normal.      Left Ear: External ear normal.      Nose:      Comments: Active bleeding from the left nostril. No easily identifiable area of bleeding. Eyes:      Conjunctiva/sclera: Conjunctivae normal.   Neck:      Trachea: No tracheal deviation. Cardiovascular:      Rate and Rhythm: Normal rate and regular rhythm. Heart sounds: Normal heart sounds. No murmur heard. Pulmonary:      Effort: No respiratory distress. Breath sounds: Normal breath sounds. No stridor. No wheezing or rales. Abdominal:      General: Bowel sounds are normal. There is no distension. Palpations: Abdomen is soft. There is no mass. Tenderness: There is no abdominal tenderness. There is no guarding or rebound. Musculoskeletal:         General: No tenderness or deformity. Skin:     General: Skin is dry. Findings: No rash. Neurological:      Motor: No abnormal muscle tone. Coordination: Coordination normal.   Psychiatric:         Behavior: Behavior normal.         Thought Content:  Thought content normal.         Judgment: Judgment normal.         Vital Signs  ED Triage Vitals [11/28/23 0728]   Temperature Pulse Respirations Blood Pressure SpO2   97.7 °F (36.5 °C) 91 18 144/81 99 %      Temp Source Heart Rate Source Patient Position - Orthostatic VS BP Location FiO2 (%)   Oral Monitor -- -- --      Pain Score       No Pain           Vitals:    11/28/23 0728   BP: 144/81   Pulse: 91         Visual Acuity      ED Medications  Medications   oxymetazoline (AFRIN) 0.05 % nasal spray 2 spray (2 sprays Each Nare Given 11/28/23 0732)       Diagnostic Studies  Results Reviewed       Procedure Component Value Units Date/Time    Hemoglobin and hematocrit, blood [[id number]]  (Normal) Collected: 11/28/23 0803    Lab Status: Final result Specimen: Blood from Arm, Left Updated: 11/28/23 0811     Hemoglobin 15.0 g/dL      Hematocrit 45.7 %                    No orders to display              Procedures  Procedures         ED Course  ED Course as of 11/28/23 0902 Tue Nov 28, 2023   0807 Clamp taken off. Not bleeding at this point. Will reevaluate after approximately 15 minutes. 7461 Hemoglobin: 15.0  wnl   0835 No active bleeding in the nostril or posterior oropharynx. SBIRT 22yo+      Flowsheet Row Most Recent Value   Initial Alcohol Screen: US AUDIT-C     1. How often do you have a drink containing alcohol? 1 Filed at: 11/28/2023 0731   2. How many drinks containing alcohol do you have on a typical day you are drinking? 1 Filed at: 11/28/2023 0731   3a. Male UNDER 65: How often do you have five or more drinks on one occasion? 0 Filed at: 11/28/2023 0731   Audit-C Score 2 Filed at: 11/28/2023 8942   VANESSA: How many times in the past year have you. .. Used an illegal drug or used a prescription medication for non-medical reasons? Never Filed at: 11/28/2023 9403                      Medical Decision Making  Patient with left-sided nasal bleed. Applied Afrin, compression. D clamped. No bleeding afterwards. Patient reports feeling woozy. Will check for anemia from blood loss. None. Will discharge home. Return precautions discussed. Problems Addressed:  Left-sided epistaxis: acute illness or injury    Amount and/or Complexity of Data Reviewed  Labs: ordered. Decision-making details documented in ED Course. Risk  OTC drugs. Disposition  Final diagnoses:   Left-sided epistaxis     Time reflects when diagnosis was documented in both MDM as applicable and the Disposition within this note       Time User Action Codes Description Comment    11/28/2023  8:35 AM Theresa Godinez Add [R04.0] Left-sided epistaxis           ED Disposition       ED Disposition   Discharge    Condition   Stable    Date/Time   Tue Nov 28, 2023 5422    Comment   [place] discharge to home/self care.                    Follow-up Information       Follow up With Specialties Details Why Contact Info Additional Information    [facility], Nose & Throat Otolaryngology Schedule an appointment as soon as possible for a visit  For re-evaluation as soon as possible [address] [facility] [id number]  [address], [facility], [address] [facility] [address], [place], [address] [facility] Emergency Department Emergency Medicine  If symptoms worsen [address] [id number]  [address] [facility] Emergency Department, [address] [place] [address], [address] [facility]            Discharge Medication List as of 11/28/2023  8:37 AM        CONTINUE these medications which have NOT CHANGED    Details   allopurinol (ZYLOPRIM) 300 mg tablet Take 1 tablet (300 mg total) by mouth daily, Starting Thu 10/19/2023, Normal      aspirin 81 mg chewable tablet Daily, Historical Med      Cholecalciferol (Vitamin D3) 1.25 MG (25359 UT) TABS Daily, Historical Med      Empagliflozin (Jardiance) 25 MG TABS Take 1 tablet (25 mg total) by mouth daily after breakfast, Starting Mon 9/18/2023, Normal      levothyroxine 25 mcg tablet Take 1 tablet (25 mcg total) by mouth daily, Starting Mon 11/13/2023, Normal      losartan (COZAAR) 25 mg tablet Take 1 tablet (25 mg total) by mouth daily, Starting Mon 9/18/2023, Normal      metFORMIN (GLUCOPHAGE) 1000 MG tablet Take 1 tablet (1,000 mg total) by mouth 2 (two) times a day with meals, Starting Thu 10/19/2023, Normal      metoprolol succinate (TOPROL-XL) 25 mg 24 hr tablet Take 1 tablet (25 mg total) by mouth daily, Starting Tue 9/12/2023, Normal      rosuvastatin (CRESTOR) 20 MG tablet Take 1 tablet (20 mg total) by mouth daily, Starting Thu 4/27/2023, Normal      Rybelsus 14 MG tablet Take 1 tablet (14 mg total) by mouth daily, Starting Tue 9/12/2023, Normal             No discharge procedures on file.     PDMP Review       None            ED Provider  Electronically Signed by             Val Llanos DO  11/28/23 4242

## 2023-12-07 ENCOUNTER — HOSPITAL ENCOUNTER (OUTPATIENT)
Dept: CT IMAGING | Facility: HOSPITAL | Age: 63
Discharge: HOME | End: 2023-12-07
Attending: INTERNAL MEDICINE
Payer: COMMERCIAL

## 2023-12-07 VITALS — HEART RATE: 73 BPM | RESPIRATION RATE: 20 BRPM | DIASTOLIC BLOOD PRESSURE: 67 MMHG | SYSTOLIC BLOOD PRESSURE: 115 MMHG

## 2023-12-07 DIAGNOSIS — I47.29 NSVT (NONSUSTAINED VENTRICULAR TACHYCARDIA) (HCC): ICD-10-CM

## 2023-12-07 DIAGNOSIS — R55 SYNCOPE, UNSPECIFIED SYNCOPE TYPE: ICD-10-CM

## 2023-12-07 DIAGNOSIS — R94.39 EQUIVOCAL STRESS TEST: ICD-10-CM

## 2023-12-07 PROCEDURE — G1004 CDSM NDSC: HCPCS

## 2023-12-07 PROCEDURE — 75574 CT ANGIO HRT W/3D IMAGE: CPT

## 2023-12-07 RX ORDER — METOPROLOL TARTRATE 1 MG/ML
5 INJECTION, SOLUTION INTRAVENOUS
Status: DISCONTINUED | OUTPATIENT
Start: 2023-12-07 | End: 2023-12-08 | Stop reason: HOSPADM

## 2023-12-07 RX ORDER — NITROGLYCERIN 0.4 MG/1
0.4 TABLET SUBLINGUAL ONCE
Status: COMPLETED | OUTPATIENT
Start: 2023-12-07 | End: 2023-12-07

## 2023-12-07 RX ADMIN — METOPROLOL TARTRATE 5 MG: 1 INJECTION, SOLUTION INTRAVENOUS at 11:07

## 2023-12-07 RX ADMIN — NITROGLYCERIN 0.4 MG: 0.4 TABLET SUBLINGUAL at 11:10

## 2023-12-07 RX ADMIN — IOHEXOL 80 ML: 350 INJECTION, SOLUTION INTRAVENOUS at 11:21

## 2023-12-07 NOTE — NURSING NOTE
Patient arrived for cardiac CT angiography. Test education reviewed with patient. Medications and allergies verified. Pt denies PDE5 inhibitor use. Patient took 75 mg Toprol xl this am as instructed by cardiology. Patient given 5 mg IV metoprolol tartrate    to reach target HR . SL nitro given per protocol. See vitals flowsheet. Tolerated test well. Educated to increase fluid intake remainder of day. Vitals stable and patient asymptomatic upon departure with spouse.

## 2023-12-12 DIAGNOSIS — E11.9 TYPE 2 DIABETES MELLITUS WITHOUT COMPLICATION, WITHOUT LONG-TERM CURRENT USE OF INSULIN (HCC): ICD-10-CM

## 2023-12-12 RX ORDER — ORAL SEMAGLUTIDE 14 MG/1
14 TABLET ORAL DAILY
Qty: 30 TABLET | Refills: 2 | Status: SHIPPED | OUTPATIENT
Start: 2023-12-12

## 2023-12-12 NOTE — TELEPHONE ENCOUNTER
Received an Rx fax sheet from the pharmacy requesting a refill on on rybelsus.  Medication sent to provider for approval.

## 2023-12-16 ENCOUNTER — APPOINTMENT (OUTPATIENT)
Dept: LAB | Facility: MEDICAL CENTER | Age: 63
End: 2023-12-16
Payer: COMMERCIAL

## 2023-12-16 DIAGNOSIS — E11.9 TYPE 2 DIABETES MELLITUS WITHOUT COMPLICATION, WITHOUT LONG-TERM CURRENT USE OF INSULIN (HCC): ICD-10-CM

## 2023-12-16 LAB
ANION GAP SERPL CALCULATED.3IONS-SCNC: 9 MMOL/L
BUN SERPL-MCNC: 27 MG/DL (ref 5–25)
CALCIUM SERPL-MCNC: 9.8 MG/DL (ref 8.4–10.2)
CHLORIDE SERPL-SCNC: 103 MMOL/L (ref 96–108)
CO2 SERPL-SCNC: 26 MMOL/L (ref 21–32)
CREAT SERPL-MCNC: 1.16 MG/DL (ref 0.6–1.3)
EST. AVERAGE GLUCOSE BLD GHB EST-MCNC: 166 MG/DL
GFR SERPL CREATININE-BSD FRML MDRD: 66 ML/MIN/1.73SQ M
GLUCOSE P FAST SERPL-MCNC: 126 MG/DL (ref 65–99)
HBA1C MFR BLD: 7.4 %
POTASSIUM SERPL-SCNC: 4.5 MMOL/L (ref 3.5–5.3)
SODIUM SERPL-SCNC: 138 MMOL/L (ref 135–147)

## 2023-12-16 PROCEDURE — 83036 HEMOGLOBIN GLYCOSYLATED A1C: CPT

## 2023-12-16 PROCEDURE — 80048 BASIC METABOLIC PNL TOTAL CA: CPT

## 2023-12-16 PROCEDURE — 36415 COLL VENOUS BLD VENIPUNCTURE: CPT

## 2023-12-19 ENCOUNTER — OFFICE VISIT (OUTPATIENT)
Dept: FAMILY MEDICINE CLINIC | Facility: CLINIC | Age: 63
End: 2023-12-19
Payer: COMMERCIAL

## 2023-12-19 VITALS
TEMPERATURE: 98.2 F | SYSTOLIC BLOOD PRESSURE: 122 MMHG | DIASTOLIC BLOOD PRESSURE: 74 MMHG | BODY MASS INDEX: 26.31 KG/M2 | OXYGEN SATURATION: 98 % | WEIGHT: 205 LBS | HEART RATE: 83 BPM | RESPIRATION RATE: 18 BRPM | HEIGHT: 74 IN

## 2023-12-19 DIAGNOSIS — E11.9 TYPE 2 DIABETES MELLITUS WITHOUT COMPLICATION, WITHOUT LONG-TERM CURRENT USE OF INSULIN (HCC): Primary | ICD-10-CM

## 2023-12-19 DIAGNOSIS — I48.0 PAROXYSMAL ATRIAL FIBRILLATION (HCC): ICD-10-CM

## 2023-12-19 DIAGNOSIS — I10 HTN (HYPERTENSION), BENIGN: ICD-10-CM

## 2023-12-19 DIAGNOSIS — E03.9 ACQUIRED HYPOTHYROIDISM: ICD-10-CM

## 2023-12-19 DIAGNOSIS — E78.2 MIXED HYPERLIPIDEMIA: ICD-10-CM

## 2023-12-19 PROCEDURE — 99213 OFFICE O/P EST LOW 20 MIN: CPT

## 2023-12-19 NOTE — ASSESSMENT & PLAN NOTE
Under control.  Continue current medication including anticoagulation medication to prevent stroke.  We will reevaluate at next office visit.  Has been followed by cardiologist.

## 2023-12-19 NOTE — ASSESSMENT & PLAN NOTE
Lab Results   Component Value Date    HGBA1C 7.4 (H) 12/16/2023   Under reasonable control.  Continue current medication.  We will continue to monitor

## 2023-12-19 NOTE — PROGRESS NOTES
Assessment/Plan:         Problem List Items Addressed This Visit     HTN (hypertension), benign     Under control.  Continue losartan and metoprolol.  We will continue to monitor         Mixed hyperlipidemia     Under control.  Continue rosuvastatin.  We will continue to monitor.         Type 2 diabetes mellitus without complication, without long-term current use of insulin (HCC) - Primary       Lab Results   Component Value Date    HGBA1C 7.4 (H) 12/16/2023   Under reasonable control.  Continue current medication.  We will continue to monitor         Paroxysmal atrial fibrillation (HCC)     Under control.  Continue current medication including anticoagulation medication to prevent stroke.  We will reevaluate at next office visit.  Has been followed by cardiologist.           Acquired hypothyroidism     Under control.  TSH in therapeutic range.  Continue same dose of levothyroxine.  We will continue to monitor              Subjective:      Patient ID: Lv Lobato is a 63 y.o. male.    Patient here for review of chronic medical problems and  the labs and imaging if it is applicable.  Currently has no specific complaints other than mentioned in the review of systems  Denies chest pain, SOB, cough, abdominal pain, nausea, vomiting, fever, chills, lightheadedness, dizziness,headache, tingling or numbness.No bowel or bladder problem.          The following portions of the patient's history were reviewed and updated as appropriate:   Past Medical History:  He has a past medical history of Allergic rhinitis, Carotid stenosis, Colon polyps, Degenerative joint disease, Diabetes mellitus (HCC), Diverticulitis, Glaucoma, Gout, Hyperlipidemia, Kidney stones, Obesity, Pericarditis, and SVT (supraventricular tachycardia).,  _______________________________________________________________________  Medical Problems:  does not have any pertinent problems on  file.,  _______________________________________________________________________  Past Surgical History:   has a past surgical history that includes Retinal detachment surgery (Left); Colonoscopy; Cardiac surgery; and Cataract extraction (Bilateral).,  _______________________________________________________________________  Family History:  family history includes Arthritis in his mother; Coronary artery disease in his brother and mother; Diabetes in his mother; Heart attack in his father, paternal grandfather, and paternal grandmother.,  _______________________________________________________________________  Social History:   reports that he has never smoked. He has been exposed to tobacco smoke. He has never used smokeless tobacco. He reports that he does not currently use alcohol. He reports that he does not use drugs.,  _______________________________________________________________________  Allergies:  has No Known Allergies..  _______________________________________________________________________  Current Outpatient Medications   Medication Sig Dispense Refill   • allopurinol (ZYLOPRIM) 300 mg tablet Take 1 tablet (300 mg total) by mouth daily 90 tablet 0   • aspirin 81 mg chewable tablet Daily     • Cholecalciferol (Vitamin D3) 1.25 MG (84189 UT) TABS Daily     • Empagliflozin (Jardiance) 25 MG TABS Take 1 tablet (25 mg total) by mouth daily after breakfast 30 tablet 3   • levothyroxine 25 mcg tablet Take 1 tablet (25 mcg total) by mouth daily 30 tablet 2   • losartan (COZAAR) 25 mg tablet Take 1 tablet (25 mg total) by mouth daily 30 tablet 3   • metFORMIN (GLUCOPHAGE) 1000 MG tablet Take 1 tablet (1,000 mg total) by mouth 2 (two) times a day with meals 180 tablet 0   • metoprolol succinate (TOPROL-XL) 25 mg 24 hr tablet Take 1 tablet (25 mg total) by mouth daily 90 tablet 1   • rosuvastatin (CRESTOR) 20 MG tablet Take 1 tablet (20 mg total) by mouth daily 90 tablet 3   • Rybelsus 14 MG tablet Take 1 tablet  "(14 mg total) by mouth daily 30 tablet 2     No current facility-administered medications for this visit.     _______________________________________________________________________  Review of Systems   Constitutional:  Negative for chills, fatigue and fever.   HENT:  Negative for congestion, ear pain, rhinorrhea, sneezing and sore throat.    Eyes:  Positive for visual disturbance. Negative for pain, discharge, redness and itching.   Respiratory:  Negative for cough, chest tightness and shortness of breath.    Cardiovascular:  Negative for chest pain, palpitations and leg swelling.   Gastrointestinal:  Negative for abdominal pain, blood in stool, diarrhea, nausea and vomiting.   Endocrine: Negative for cold intolerance, heat intolerance, polydipsia, polyphagia and polyuria.   Genitourinary:  Negative for dysuria, frequency, hematuria and urgency.   Musculoskeletal:  Negative for arthralgias, back pain and myalgias.   Skin:  Positive for wound (Laceration with stitches left forehead above the eyebrow). Negative for color change and rash.   Neurological:  Negative for dizziness, weakness, light-headedness, numbness and headaches.   Hematological:  Does not bruise/bleed easily.   Psychiatric/Behavioral:  Negative for agitation, behavioral problems and confusion.          Objective:  Vitals:    12/19/23 1334   BP: 122/74   BP Location: Right arm   Patient Position: Sitting   Cuff Size: Standard   Pulse: 83   Resp: 18   Temp: 98.2 °F (36.8 °C)   TempSrc: Temporal   SpO2: 98%   Weight: 93 kg (205 lb)   Height: 6' 2\" (1.88 m)     Body mass index is 26.32 kg/m².     Physical Exam  Vitals and nursing note reviewed.   Constitutional:       General: He is not in acute distress.     Appearance: Normal appearance. He is not ill-appearing, toxic-appearing or diaphoretic.   HENT:      Head: Normocephalic and atraumatic.      Right Ear: Tympanic membrane and ear canal normal.      Left Ear: Tympanic membrane and ear canal normal. "      Nose: Nose normal. No congestion.      Mouth/Throat:      Mouth: Mucous membranes are moist.   Eyes:      General: No scleral icterus.        Right eye: No discharge.         Left eye: No discharge.      Extraocular Movements: Extraocular movements intact.      Conjunctiva/sclera: Conjunctivae normal.      Pupils: Pupils are equal, round, and reactive to light.   Cardiovascular:      Rate and Rhythm: Normal rate and regular rhythm.      Pulses: Normal pulses. no weak pulses          Dorsalis pedis pulses are 2+ on the right side and 2+ on the left side.        Posterior tibial pulses are 2+ on the right side and 2+ on the left side.      Heart sounds: Normal heart sounds. No murmur heard.     No gallop.   Pulmonary:      Effort: Pulmonary effort is normal. No respiratory distress.      Breath sounds: Normal breath sounds. No wheezing, rhonchi or rales.   Abdominal:      General: Abdomen is flat. Bowel sounds are normal. There is no distension.      Palpations: Abdomen is soft.      Tenderness: There is no abdominal tenderness. There is no right CVA tenderness, left CVA tenderness or guarding.   Musculoskeletal:         General: No swelling or tenderness. Normal range of motion.      Cervical back: Normal range of motion and neck supple. No rigidity.      Right lower leg: No edema.      Left lower leg: No edema.   Feet:      Right foot:      Skin integrity: No ulcer, skin breakdown, erythema, warmth, callus or dry skin.      Left foot:      Skin integrity: No ulcer, skin breakdown, erythema, warmth, callus or dry skin.   Lymphadenopathy:      Cervical: No cervical adenopathy.   Skin:     General: Skin is warm.      Capillary Refill: Capillary refill takes 2 to 3 seconds.      Coloration: Skin is not jaundiced.      Findings: No bruising or rash.   Neurological:      General: No focal deficit present.      Mental Status: He is alert and oriented to person, place, and time. Mental status is at baseline.       Motor: No weakness.      Gait: Gait normal.   Psychiatric:         Mood and Affect: Mood normal.         Behavior: Behavior normal.       Diabetic Foot Exam    Patient's shoes and socks removed.    Right Foot/Ankle   Right Foot Inspection  Skin Exam: skin normal. Skin not intact, no dry skin, no warmth, no callus, no erythema, no maceration, no abnormal color, no pre-ulcer, no ulcer and no callus.     Toe Exam: ROM and strength within normal limits. No swelling, no tenderness, erythema and  no right toe deformity    Sensory   Vibration: intact  Proprioception: intact  Monofilament testing: intact    Vascular  Capillary refills: < 3 seconds  The right DP pulse is 2+. The right PT pulse is 2+.     Left Foot/Ankle  Left Foot Inspection  Skin Exam: skin normal. Skin not intact, no dry skin, no warmth, no erythema, no maceration, normal color, no pre-ulcer, no ulcer and no callus.     Toe Exam: ROM and strength within normal limits. No swelling, no tenderness, no erythema and no left toe deformity.     Sensory   Vibration: intact  Proprioception: intact  Monofilament testing: intact    Vascular  Capillary refills: < 3 seconds  The left DP pulse is 2+. The left PT pulse is 2+.     Assign Risk Category  No deformity present  No loss of protective sensation  No weak pulses  Risk: 0

## 2023-12-19 NOTE — ASSESSMENT & PLAN NOTE
Under control.  TSH in therapeutic range.  Continue same dose of levothyroxine.  We will continue to monitor

## 2024-01-08 DIAGNOSIS — I10 HTN (HYPERTENSION), BENIGN: ICD-10-CM

## 2024-01-08 DIAGNOSIS — E11.9 TYPE 2 DIABETES MELLITUS WITHOUT COMPLICATION, WITHOUT LONG-TERM CURRENT USE OF INSULIN (HCC): ICD-10-CM

## 2024-01-08 RX ORDER — LOSARTAN POTASSIUM 25 MG/1
25 TABLET ORAL DAILY
Qty: 30 TABLET | Refills: 3 | Status: SHIPPED | OUTPATIENT
Start: 2024-01-08

## 2024-01-08 NOTE — TELEPHONE ENCOUNTER
Received an Rx fax sheet from the pharmacy requesting a refill on losartan - potassium and jardiance. Medication sent to provider for approval.

## 2024-01-11 DIAGNOSIS — Z87.39 HISTORY OF GOUT: ICD-10-CM

## 2024-01-11 DIAGNOSIS — E11.9 TYPE 2 DIABETES MELLITUS WITHOUT COMPLICATION, WITHOUT LONG-TERM CURRENT USE OF INSULIN (HCC): ICD-10-CM

## 2024-01-11 DIAGNOSIS — I10 HTN (HYPERTENSION), BENIGN: ICD-10-CM

## 2024-01-11 RX ORDER — ALLOPURINOL 300 MG/1
300 TABLET ORAL DAILY
Qty: 90 TABLET | Refills: 0 | Status: SHIPPED | OUTPATIENT
Start: 2024-01-11

## 2024-02-01 ENCOUNTER — OFFICE VISIT (OUTPATIENT)
Dept: CARDIOLOGY CLINIC | Facility: CLINIC | Age: 64
End: 2024-02-01
Payer: COMMERCIAL

## 2024-02-01 VITALS
OXYGEN SATURATION: 93 % | BODY MASS INDEX: 26.18 KG/M2 | HEIGHT: 74 IN | WEIGHT: 204 LBS | SYSTOLIC BLOOD PRESSURE: 114 MMHG | DIASTOLIC BLOOD PRESSURE: 70 MMHG | HEART RATE: 92 BPM

## 2024-02-01 DIAGNOSIS — I42.9 CARDIOMYOPATHY, UNSPECIFIED TYPE (HCC): ICD-10-CM

## 2024-02-01 DIAGNOSIS — Z86.79 S/P CATHETER ABLATION OF SLOW PATHWAY: ICD-10-CM

## 2024-02-01 DIAGNOSIS — R93.1 ELEVATED CORONARY ARTERY CALCIUM SCORE: Primary | ICD-10-CM

## 2024-02-01 DIAGNOSIS — E78.2 MIXED HYPERLIPIDEMIA: ICD-10-CM

## 2024-02-01 DIAGNOSIS — Z82.49 FAMILY HISTORY OF EARLY CAD: ICD-10-CM

## 2024-02-01 DIAGNOSIS — Z98.890 S/P CATHETER ABLATION OF SLOW PATHWAY: ICD-10-CM

## 2024-02-01 PROCEDURE — 99214 OFFICE O/P EST MOD 30 MIN: CPT | Performed by: INTERNAL MEDICINE

## 2024-02-01 NOTE — PROGRESS NOTES
[facility] [place]  [place] [facility] [address]  Phone#  [phone]  Fax#  [phone]                                               Cardiology Office Follow up  Lv Lobato, 63 y.o. male  YOB: 1960  MRN: [id number] Encounter: [id number]      PCP - Cheli Thakkar MD  Referring Provider - No ref. provider found    No chief complaint on file.        Assessment  Coronary artery disease, elevated coronary artery calcium score  CTA cardiac 23: CCS = 3919. 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   s/p pericardiectomy (@[facility])  For infected pericardium  At age of 9 ()  Family history of early CAD  Brother was otherwise healthy, and suddenly needed CABG at 58  Father  suddenly of presumed heart attack  Diabetes Mellitus Type 2  A1c 7.2% (22)  CKD-2  Cr 1.35 in 2020  Hyperlipidemia    Plan  CAD, elevated coronary artery calcium score, family history of early CAD, NSVT  Echo and nuclear stress test had some concerns about basal inferoseptal region, but cardiac CTA did not show any significant obstructive CAD  TTE: Dyskinesis/hypokinesis of basal-mid septal/inferior walls - ?related to prior surgery  Nuclear Rx: 2023: LVEF 54%, stress ECG nondiagnostic due to resting ST-T changes, small perfusion defect in basal inferoseptal.inferior wall - possible artifact v ischemia  Coronary artery calcium score is very high at 3919, 99th percentile suggesting he clearly does have diffuse underlying multivessel CAD, but no severe stenosis at  present  Impression  Diffuse, non-obstructive CAD --> needs risk optimization  Plan  Continue aspirin 81 mg daily, rosuvastatin 20 mg daily  Optimize diabetes control --> Follow up with PCP  Follow-up lipid panel, CMP prior to next office visit, to consider further uptitration of rosuvastatin or addition of ezetimibe  Monitor for symptoms of chest pain / shortness of breath / ACS/angina & let us know if new or recurrent symptoms    Syncope, PSVT s/p AVNRT ablation, NSVT, PACs  Overview  7/29/23: had sudden syncope while sitting at table with family in [place] --> evaluated at [place], [place] --> had intermittent tachycardia on telemetry, and was evaluated by EP --> EPS and ?AVNRT ablation   9/12/23: no further dizziness, near syncope or syncope. No palpitations  10/19/23: Records obtained from [facility] -   symptomatic SVT/AVNRT --> slow pathway ablation for the AVNRT.    1 month event monitor - no further SVT.    one 8 beat run of NSVT on 8/8/2023 at 2:30 PM (asymptomatic)  2/1/24: Further dizziness or lightheadedness, near-syncope or syncope.  No palpitations  Plan  Continue metoprolol succinate 25 mg daily  Monitor for symptoms of palpitations, dizziness or lightheadedness, near-syncope or syncope  Minimize caffeine and alcohol  Avoid smoking                 Hyperlipidemia    Cholesterol levels much improved and continues to do well on higher dose of rosuvastatin 20 mg daily  Check follow-up lipid panel, CMP prior to next office visit and consider up titration of rosuvastatin to achieve an LDL goal of less than 70   Continue same rosuvastatin 20 mg daily for now    Ascending aortic dilatation  TTE 2/2023: Ascending aorta measured 4 cm / borderline  Cardiac CTA - visualized thoracic aorta WNL  Optimize blood pressure control    No results found for this visit on 02/01/24.      Orders Placed This Encounter   Procedures   • Lipid Panel with Direct LDL reflex   • Comprehensive metabolic panel  "          Return in about 4 months (around 2024), or if symptoms worsen or fail to improve.      History of Present Illness   63 y.o. male , who works as  at [employer], comes in as a new patient for consultation regarding cardiac risk stratification due to family history of early CAD.    He reports no active or recent symptoms of chest pain or shortness of breath.  No complaints of palpitations, dizziness or lightheadedness, near-syncope or syncope.  No prior history of CAD or heart failure.  He remains quite active with working and reports being able to go up 2 flights of stairs without any major symptoms.  Recently his brother was also quite healthy suddenly needed bypass surgery, and this is got his wife very concerned.  Additionally his father also has a history of dying suddenly in his 60s, and as a result he is here for cardiac risk stratification    He does report to me that he had a cardiac surgery at the age of 9, which involved stripping the entire covering around the heart due to an infection in that area.  This was reportedly done at [facility] around .  He had limited follow-up after that, but has not seen a cardiologist in many decades.    Family history  Father -  in sleep at 65, of presumed heart attack  Mother - went for pre-op for knee replacement, found to have cardiomyopathy and very low EF.  Paternal uncle -  at age 35 of \"massive heart attack\"  Brother - CABG at age 58    Interval history - 2023  He comes back for follow-up after about 3 months.  In the interim he completed the echocardiogram, which showed signs of dyskinesis/wall motion noted in inferoseptal/inferior wall, and as result his stress test was switched to a nuclear stress test.  This did not reveal any significant evidence of ischemia or infarct, but there was an artifact.  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    Interval history - 9/12/2023  He comes back for follow-up after about 5 months.  In the interim on 7/29/2023, he was in [place] with family sitting at the table when he suddenly passed out without any warning.  He did not have any symptoms of chest pain, palpitations or dizziness before or after this.  He was taken to [facility] in [facility].  In the ED, he apparently had runs of tachycardia on telemetry associated with some dizziness and as a result was evaluated by electrophysiologist at the hospital.  He subsequently underwent EP study and reportedly had an ablation done.  He was eventually discharged with a 1 month event monitor, which was also completed last month but he has not received any results regarding the same.  He continues to feel well and is free of chest pain or shortness of breath.  No further complaints of dizziness or lightheadedness, near-syncope or syncope    Interval history - 10/20/2023  He returns for follow-up after 1 month. In the interim, records obtained from [facility] and were reviewed today with patient.  He had SVT/AVNRT, which was felt to be symptomatic and causing him to be dizzy by EP.  So he underwent slow pathway ablation for the AVNRT.  He subsequently had a 1 month event monitor done, which did not reveal any further SVT.  It did show one 8 beat run of NSVT on 8/8/2023 at 2:30 PM, although there were no symptoms reported with it.  Over the last 2 months he has not had any further dizziness or lightheadedness, near-syncope or syncope.  He remains free of chest pain or any major shortness of breath, although does not exercise.  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    Interval history - 2/1/2024  He returns for follow-up after 4 months.  In  the interim, he completed the cardiac CTA.  This showed very high coronary calcium score, but no significant obstructive coronary artery disease.  He has remained free of chest pain, shortness of breath, palpitations or dizziness and has not had any further syncopal episodes.      Historical Information   Past Medical History:   Diagnosis Date   • Allergic rhinitis    • Carotid stenosis    • Colon polyps    • Degenerative joint disease    • Diabetes mellitus (HCC)    • Diverticulitis    • Glaucoma    • Gout    • Hyperlipidemia    • Kidney stones    • Obesity    • Pericarditis    • SVT (supraventricular tachycardia)     possibly or afib     Past Surgical History:   Procedure Laterality Date   • CARDIAC SURGERY     • CATARACT EXTRACTION Bilateral     Left eye 4/30/2021 and right eye 5/3/2021   • COLONOSCOPY     • RETINAL DETACHMENT SURGERY Left      Family History   Problem Relation Age of Onset   • Coronary artery disease Mother    • Diabetes Mother    • Arthritis Mother         Rheumatoid   • Heart attack Father    • Coronary artery disease Brother    • Heart attack Paternal Grandmother    • Heart attack Paternal Grandfather      Current Outpatient Medications on File Prior to Visit   Medication Sig Dispense Refill   • allopurinol (ZYLOPRIM) 300 mg tablet Take 1 tablet (300 mg total) by mouth daily 90 tablet 0   • aspirin 81 mg chewable tablet Daily     • Cholecalciferol (Vitamin D3) 1.25 MG (74181 UT) TABS Daily     • Empagliflozin (Jardiance) 25 MG TABS Take 1 tablet (25 mg total) by mouth daily after breakfast 30 tablet 3   • levothyroxine 25 mcg tablet Take 1 tablet (25 mcg total) by mouth daily 30 tablet 2   • losartan (COZAAR) 25 mg tablet Take 1 tablet (25 mg total) by mouth daily 30 tablet 3   • metFORMIN (GLUCOPHAGE) 1000 MG tablet Take 1 tablet (1,000 mg total) by mouth 2 (two) times a day with meals 180 tablet 0   • metoprolol succinate (TOPROL-XL) 25 mg 24 hr tablet Take 1 tablet (25 mg total) by mouth  "daily 90 tablet 1   • rosuvastatin (CRESTOR) 20 MG tablet Take 1 tablet (20 mg total) by mouth daily 90 tablet 3   • Rybelsus 14 MG tablet Take 1 tablet (14 mg total) by mouth daily 30 tablet 2     No current facility-administered medications on file prior to visit.     No Known Allergies  Social History     Socioeconomic History   • Marital status: /Civil Union     Spouse name: None   • Number of children: None   • Years of education: None   • Highest education level: None   Occupational History   • None   Tobacco Use   • Smoking status: Never     Passive exposure: Past   • Smokeless tobacco: Never   Vaping Use   • Vaping status: Never Used   Substance and Sexual Activity   • Alcohol use: Not Currently   • Drug use: Never   • Sexual activity: None   Other Topics Concern   • None   Social History Narrative   • None     Social Determinants of Health     Financial Resource Strain: Not on file   Food Insecurity: Not on file   Transportation Needs: Not on file   Physical Activity: Not on file   Stress: Not on file   Social Connections: Not on file   Intimate Partner Violence: Not on file   Housing Stability: Not on file        Review of Systems   All other systems reviewed and are negative.        Vitals:  Vitals:    02/01/24 1305   BP: 114/70   BP Location: Left arm   Patient Position: Sitting   Pulse: 92   SpO2: 93%   Weight: 92.5 kg (204 lb)   Height: 6' 2\" (1.88 m)     BMI - Body mass index is 26.19 kg/m².  Wt Readings from Last 7 Encounters:   02/01/24 92.5 kg (204 lb)   12/19/23 93 kg (205 lb)   11/28/23 93 kg (205 lb)   10/20/23 93 kg (205 lb)   09/18/23 93.8 kg (206 lb 12.8 oz)   09/12/23 92.5 kg (204 lb)   08/08/23 91.2 kg (201 lb)       Physical Exam  Vitals and nursing note reviewed.   Constitutional:       General: He is not in acute distress.     Appearance: He is well-developed. He is not ill-appearing or diaphoretic.   HENT:      Head: Normocephalic and atraumatic.      Nose: No congestion.   Eyes: " "     General: No scleral icterus.     Conjunctiva/sclera: Conjunctivae normal.   Neck:      Vascular: No carotid bruit or JVD.   Cardiovascular:      Rate and Rhythm: Normal rate and regular rhythm.      Heart sounds: Normal heart sounds. No murmur heard.     No friction rub. No gallop.   Pulmonary:      Effort: Pulmonary effort is normal. No respiratory distress.      Breath sounds: Normal breath sounds. No wheezing or rales.   Chest:      Chest wall: No tenderness.   Abdominal:      General: There is no distension.      Palpations: Abdomen is soft.      Tenderness: There is no abdominal tenderness.   Musculoskeletal:         General: No swelling, tenderness or deformity.      Cervical back: Neck supple. No muscular tenderness.      Right lower leg: No edema.      Left lower leg: No edema.   Skin:     General: Skin is warm.   Neurological:      General: No focal deficit present.      Mental Status: He is alert and oriented to person, place, and time. Mental status is at baseline.   Psychiatric:         Mood and Affect: Mood normal.         Behavior: Behavior normal.         Thought Content: Thought content normal.           Labs:  CBC:   Lab Results   Component Value Date    WBC 6.34 05/17/2023    RBC 5.78 (H) 05/17/2023    HGB 15.0 11/28/2023    HCT 45.7 11/28/2023    MCV 92 05/17/2023     05/17/2023    RDW 12.7 05/17/2023       CMP:   Lab Results   Component Value Date    K 4.5 12/16/2023     12/16/2023    CO2 26 12/16/2023    BUN 27 (H) 12/16/2023    CREATININE 1.16 12/16/2023    EGFR 66 12/16/2023    CALCIUM 9.8 12/16/2023    AST 29 09/05/2020    ALT 27 09/05/2020    ALKPHOS 85.7 09/05/2020       Magnesium:  No results found for: \"MG\"    Lipid Profile:   Lab Results   Component Value Date    HDL 38 (L) 05/17/2023    TRIG 116 05/17/2023    LDLCALC 76 05/17/2023       Thyroid Studies: No results found for: \"LNE8TZAXDLHB\", \"T3FREE\", \"FREET4\", \"P3DBLJI\", \"Z8PTYKO\"    A1c:  No components found for: " "\"HGA1C\"    INR:  No results found for: \"INR\"5    Imaging: No results found.    Cardiac testing:   No results found for this or any previous visit.    No results found for this or any previous visit.    No results found for this or any previous visit.    No results found for this or any previous visit.      CTA cardiac  Narrative: CORONARY CT ANGIOGRAM AND CT CALCIUM SCORE - WITHOUT AND WITH IV CONTRAST    INDICATION:  R55: Syncope and collapse  R94.39: Abnormal result of other cardiovascular function study  I47.29: Other ventricular tachycardia. Equivocal nuclear medicine stress test.  Patient Age:  63 years  Patient Gender:  Male.    COMPARISON: Nuclear medicine stress test performed February 21, 2023    TECHNIQUE: Noncontrast CT examination of the heart examination was performed according to a protocol designed to obtain coronary calcium score.  Thin section postcontrast images of the heart were obtained according to gated coronary CT angiographic   protocol.  2D and 3D image reconstruction was performed on an independent workstation in order to perform coronary vessel analysis.  Premedication was administered according to institutional protocol, as detailed in the electronic health record.    Prospective ECG triggering was used.    This examination, like all CT scans performed in the [facility], was performed utilizing techniques to minimize radiation dose exposure, including the use of iterative reconstruction and automated exposure control.  Radiation dose length   product (DLP) for this visit:  354.92 mGy-cm .    IV CONTRAST:  80 mL of iohexol (OMNIPAQUE)    IMAGE QUALITY: Large volume of densely calcified plaque results in minimal image quality degradation. Otherwise excellent image quality with no significant motion artifact present.    FINDINGS:    CORONARY CALCIUM SCORE: 3919    Calcium score PERCENTILE of age, race, and gender matched database participants in the Multi-Ethnic Study of " Atherosclerosis (VASQUEZ) trial:   99th percentile    CORONARY ARTERY ANATOMY:  Coronary arteries arise in normal position.  There is right coronary artery dominance.  There is typical bifurcation of the left main coronary artery into left anterior descending and left circumflex coronary arteries.    LEFT MAIN:  No atherosclerotic plaque or vascular stenosis.    LAD AND DIAGONAL BRANCHES:  No atherosclerotic plaque or vascular stenosis.    LCX:  No atherosclerotic plaque or vascular stenosis.    RCA:  Right coronary artery gives rise to patent posterior descending and posterolateral left ventricular branches.    No atherosclerotic plaque or vascular stenosis.    CARDIAC STRUCTURES:  Valves: Minimal aortic valvular calcification. Valves appear otherwise unremarkable.  Pericardium:  Normal pericardial contour without pericardial effusion, thickening, or calcifications.  Myocardium:  No abnormal myocardial thinning, myocardial thickening, or myocardial calcification.    GREAT VESSELS: Visualized thoracic aorta and central pulmonary arterial tree are within normal limits for the patient's age.    EXTRACARDIAC FINDINGS: No significant findings identified on limited small field of view low radiation dose noncontrast images of the chest at the level of the heart.  Impression: Despite the presence of very high volume of densely calcified atherosclerotic coronary artery plaque, flow-limiting coronary artery stenosis is confidently excluded.    CAD-RADS 2    - Degree of maximal coronary stenosis = 25 - 49%  - Mild nonobstructive coronary artery disease.    Management recommendations:  - Consider nonatherosclerotic causes of chest pain.  - Consider preventative therapy and risk factor modification, particularly for patients with nonobstructive plaque in multiple segments.    Total coronary calcium score equals 3919.  For more useful information regarding the prognostic significance of the calcium score, please consult the  calculator at the website [email or web address].    Workstation performed: [id number]

## 2024-02-14 DIAGNOSIS — E03.9 ACQUIRED HYPOTHYROIDISM: ICD-10-CM

## 2024-02-14 RX ORDER — LEVOTHYROXINE SODIUM 0.03 MG/1
25 TABLET ORAL DAILY
Qty: 30 TABLET | Refills: 1 | Status: SHIPPED | OUTPATIENT
Start: 2024-02-14

## 2024-02-14 NOTE — TELEPHONE ENCOUNTER
Received an Rx fax sheet from the pharmacy requesting a refill on medication . Med sent to covering provider.

## 2024-03-05 ENCOUNTER — HOSPITAL ENCOUNTER (OUTPATIENT)
Dept: CT IMAGING | Facility: HOSPITAL | Age: 64
Discharge: HOME | End: 2024-03-05
Payer: COMMERCIAL

## 2024-03-05 DIAGNOSIS — Z98.890 HX OF COLONOSCOPY: ICD-10-CM

## 2024-03-05 PROCEDURE — 74261 CT COLONOGRAPHY DX: CPT

## 2024-03-05 PROCEDURE — G1004 CDSM NDSC: HCPCS

## 2024-03-13 DIAGNOSIS — I10 HTN (HYPERTENSION), BENIGN: ICD-10-CM

## 2024-03-13 RX ORDER — METOPROLOL SUCCINATE 25 MG/1
25 TABLET, EXTENDED RELEASE ORAL DAILY
Qty: 90 TABLET | Refills: 1 | Status: SHIPPED | OUTPATIENT
Start: 2024-03-13

## 2024-03-15 ENCOUNTER — TELEPHONE (OUTPATIENT)
Dept: FAMILY MEDICINE CLINIC | Facility: CLINIC | Age: 64
End: 2024-03-15

## 2024-03-21 ENCOUNTER — OFFICE VISIT (OUTPATIENT)
Dept: FAMILY MEDICINE CLINIC | Facility: CLINIC | Age: 64
End: 2024-03-21
Payer: COMMERCIAL

## 2024-03-21 VITALS
OXYGEN SATURATION: 100 % | DIASTOLIC BLOOD PRESSURE: 70 MMHG | RESPIRATION RATE: 16 BRPM | WEIGHT: 206.9 LBS | HEIGHT: 74 IN | TEMPERATURE: 97.9 F | SYSTOLIC BLOOD PRESSURE: 112 MMHG | BODY MASS INDEX: 26.55 KG/M2 | HEART RATE: 90 BPM

## 2024-03-21 DIAGNOSIS — I10 HTN (HYPERTENSION), BENIGN: ICD-10-CM

## 2024-03-21 DIAGNOSIS — E78.2 MIXED HYPERLIPIDEMIA: ICD-10-CM

## 2024-03-21 DIAGNOSIS — I48.0 PAROXYSMAL ATRIAL FIBRILLATION (HCC): ICD-10-CM

## 2024-03-21 DIAGNOSIS — E03.9 ACQUIRED HYPOTHYROIDISM: ICD-10-CM

## 2024-03-21 DIAGNOSIS — E11.9 TYPE 2 DIABETES MELLITUS WITHOUT COMPLICATION, WITHOUT LONG-TERM CURRENT USE OF INSULIN (HCC): Primary | ICD-10-CM

## 2024-03-21 PROCEDURE — 99213 OFFICE O/P EST LOW 20 MIN: CPT

## 2024-03-21 NOTE — PROGRESS NOTES
Assessment/Plan:         Problem List Items Addressed This Visit     HTN (hypertension), benign     Under control.  Continue losartan and metoprolol.  We will continue to monitor         Mixed hyperlipidemia     Under control.  Continue rosuvastatin.  We will continue to monitor.          Type 2 diabetes mellitus without complication, without long-term current use of insulin (HCC) - Primary       Lab Results   Component Value Date    HGBA1C 7.4 (H) 12/16/2023   Under reasonable control.    Continue current medication.    We will re-evaluate at next office visit.           Relevant Orders    Hemoglobin A1C    Albumin / creatinine urine ratio    Paroxysmal atrial fibrillation (HCC)     Under control.  Continue current medication including anticoagulation medication to prevent stroke.  We will reevaluate at next office visit.  Has been followed by cardiologist.           Acquired hypothyroidism     Under control.  TSH in therapeutic range.  Continue same dose of levothyroxine.  We will continue to monitor              Subjective:      Patient ID: Lv Lobato is a 63 y.o. male.    Patient here for review of chronic medical problems and  the labs and imaging if it is applicable.  Currently has no specific complaints other than mentioned in the review of systems  Denies chest pain, SOB, cough, abdominal pain, nausea, vomiting, fever, chills, lightheadedness, dizziness,headache, tingling or numbness.No bowel or bladder problem.          The following portions of the patient's history were reviewed and updated as appropriate:   Past Medical History:  He has a past medical history of Allergic rhinitis, Carotid stenosis, Colon polyps, Degenerative joint disease, Diabetes mellitus (HCC), Diverticulitis, Glaucoma, Gout, Hyperlipidemia, Kidney stones, Obesity, Pericarditis, and SVT (supraventricular tachycardia).,  _______________________________________________________________________  Medical Problems:  does not have any  pertinent problems on file.,  _______________________________________________________________________  Past Surgical History:   has a past surgical history that includes Retinal detachment surgery (Left); Colonoscopy; Cardiac surgery; and Cataract extraction (Bilateral).,  _______________________________________________________________________  Family History:  family history includes Arthritis in his mother; Coronary artery disease in his brother and mother; Diabetes in his mother; Heart attack in his father, paternal grandfather, and paternal grandmother.,  _______________________________________________________________________  Social History:   reports that he has never smoked. He has been exposed to tobacco smoke. He has never used smokeless tobacco. He reports that he does not currently use alcohol. He reports that he does not use drugs.,  _______________________________________________________________________  Allergies:  has No Known Allergies..  _______________________________________________________________________  Current Outpatient Medications   Medication Sig Dispense Refill   • allopurinol (ZYLOPRIM) 300 mg tablet Take 1 tablet (300 mg total) by mouth daily 90 tablet 0   • aspirin 81 mg chewable tablet Daily     • Cholecalciferol (Vitamin D3) 1.25 MG (90151 UT) TABS Daily     • Empagliflozin (Jardiance) 25 MG TABS Take 1 tablet (25 mg total) by mouth daily after breakfast 30 tablet 3   • levothyroxine 25 mcg tablet Take 1 tablet (25 mcg total) by mouth daily 30 tablet 1   • losartan (COZAAR) 25 mg tablet Take 1 tablet (25 mg total) by mouth daily 30 tablet 3   • metFORMIN (GLUCOPHAGE) 1000 MG tablet Take 1 tablet (1,000 mg total) by mouth 2 (two) times a day with meals 180 tablet 0   • metoprolol succinate (TOPROL-XL) 25 mg 24 hr tablet take 1 tablet by mouth once daily 90 tablet 1   • rosuvastatin (CRESTOR) 20 MG tablet Take 1 tablet (20 mg total) by mouth daily 90 tablet 3   • Rybelsus 14 MG tablet  "Take 1 tablet (14 mg total) by mouth daily 30 tablet 2     No current facility-administered medications for this visit.     _______________________________________________________________________  Review of Systems   Constitutional:  Negative for chills, fatigue and fever.   HENT:  Negative for congestion, ear pain, rhinorrhea, sneezing and sore throat.    Eyes:  Positive for visual disturbance. Negative for pain, discharge, redness and itching.   Respiratory:  Negative for cough, chest tightness and shortness of breath.    Cardiovascular:  Negative for chest pain, palpitations and leg swelling.   Gastrointestinal:  Negative for abdominal pain, blood in stool, diarrhea, nausea and vomiting.   Endocrine: Negative for cold intolerance, heat intolerance, polydipsia, polyphagia and polyuria.   Genitourinary:  Negative for dysuria, frequency, hematuria and urgency.   Musculoskeletal:  Negative for arthralgias, back pain and myalgias.   Skin:  Positive for wound (Laceration with stitches left forehead above the eyebrow). Negative for color change and rash.   Neurological:  Negative for dizziness, weakness, light-headedness, numbness and headaches.   Hematological:  Does not bruise/bleed easily.   Psychiatric/Behavioral:  Negative for agitation, behavioral problems and confusion.          Objective:  Vitals:    03/21/24 1404   BP: 112/70   BP Location: Left arm   Patient Position: Sitting   Cuff Size: Standard   Pulse: 90   Resp: 16   Temp: 97.9 °F (36.6 °C)   TempSrc: Tympanic   SpO2: 100%   Weight: 93.8 kg (206 lb 14.4 oz)   Height: 6' 2\" (1.88 m)     Body mass index is 26.56 kg/m².     Physical Exam  Vitals and nursing note reviewed.   Constitutional:       General: He is not in acute distress.     Appearance: Normal appearance. He is not ill-appearing, toxic-appearing or diaphoretic.   HENT:      Head: Normocephalic and atraumatic.      Nose: Nose normal. No congestion.      Mouth/Throat:      Mouth: Mucous membranes " are moist.   Eyes:      General: No scleral icterus.        Right eye: No discharge.         Left eye: No discharge.      Extraocular Movements: Extraocular movements intact.      Conjunctiva/sclera: Conjunctivae normal.      Pupils: Pupils are equal, round, and reactive to light.   Cardiovascular:      Rate and Rhythm: Normal rate and regular rhythm.      Pulses: Normal pulses.           Dorsalis pedis pulses are 2+ on the right side and 2+ on the left side.        Posterior tibial pulses are 2+ on the right side and 2+ on the left side.      Heart sounds: Normal heart sounds. No murmur heard.     No gallop.   Pulmonary:      Effort: Pulmonary effort is normal. No respiratory distress.      Breath sounds: Normal breath sounds. No wheezing, rhonchi or rales.   Abdominal:      General: Abdomen is flat. Bowel sounds are normal. There is no distension.      Palpations: Abdomen is soft.      Tenderness: There is no abdominal tenderness. There is no right CVA tenderness, left CVA tenderness or guarding.   Musculoskeletal:         General: No swelling or tenderness. Normal range of motion.      Cervical back: Normal range of motion and neck supple. No rigidity.      Right lower leg: No edema.      Left lower leg: No edema.   Feet:      Right foot:      Skin integrity: No ulcer, skin breakdown, erythema, warmth, callus or dry skin.      Left foot:      Skin integrity: No ulcer, skin breakdown, erythema, warmth, callus or dry skin.   Lymphadenopathy:      Cervical: No cervical adenopathy.   Skin:     General: Skin is warm.      Capillary Refill: Capillary refill takes 2 to 3 seconds.      Coloration: Skin is not jaundiced.      Findings: No bruising or rash.   Neurological:      General: No focal deficit present.      Mental Status: He is alert and oriented to person, place, and time. Mental status is at baseline.      Motor: No weakness.      Gait: Gait normal.   Psychiatric:         Mood and Affect: Mood normal.          Behavior: Behavior normal.

## 2024-03-21 NOTE — ASSESSMENT & PLAN NOTE
Lab Results   Component Value Date    HGBA1C 7.4 (H) 12/16/2023   Under reasonable control.    Continue current medication.    We will re-evaluate at next office visit.

## 2024-03-27 DIAGNOSIS — E11.9 TYPE 2 DIABETES MELLITUS WITHOUT COMPLICATION, WITHOUT LONG-TERM CURRENT USE OF INSULIN (HCC): ICD-10-CM

## 2024-03-27 RX ORDER — ORAL SEMAGLUTIDE 14 MG/1
14 TABLET ORAL DAILY
Qty: 30 TABLET | Refills: 5 | Status: SHIPPED | OUTPATIENT
Start: 2024-03-27

## 2024-03-27 NOTE — TELEPHONE ENCOUNTER
Reason for call:   [x] Refill   [] Prior Auth  [] Other:     Office:   [x] PCP/Provider - Ariane [address]    [] Specialty/Provider -     Medication: Rybelsus     Dose/Frequency: 14mg  daily     Quantity: 30    Pharmacy: [facility] #[id number]     Does the patient have enough for 3 days?   [] Yes   [x] No - Send as HP to POD

## 2024-04-02 DIAGNOSIS — E11.9 TYPE 2 DIABETES MELLITUS WITHOUT COMPLICATION, WITHOUT LONG-TERM CURRENT USE OF INSULIN (HCC): ICD-10-CM

## 2024-04-02 DIAGNOSIS — Z87.39 HISTORY OF GOUT: ICD-10-CM

## 2024-04-02 RX ORDER — ALLOPURINOL 300 MG/1
300 TABLET ORAL DAILY
Qty: 90 TABLET | Refills: 1 | Status: SHIPPED | OUTPATIENT
Start: 2024-04-02

## 2024-04-10 DIAGNOSIS — E78.2 MIXED HYPERLIPIDEMIA: ICD-10-CM

## 2024-04-10 DIAGNOSIS — E03.9 ACQUIRED HYPOTHYROIDISM: ICD-10-CM

## 2024-04-10 RX ORDER — LEVOTHYROXINE SODIUM 0.03 MG/1
25 TABLET ORAL DAILY
Qty: 30 TABLET | Refills: 0 | Status: SHIPPED | OUTPATIENT
Start: 2024-04-10

## 2024-04-10 RX ORDER — ROSUVASTATIN CALCIUM 20 MG/1
20 TABLET, COATED ORAL DAILY
Qty: 90 TABLET | Refills: 1 | Status: SHIPPED | OUTPATIENT
Start: 2024-04-10

## 2024-05-08 DIAGNOSIS — I10 HTN (HYPERTENSION), BENIGN: ICD-10-CM

## 2024-05-08 DIAGNOSIS — E11.9 TYPE 2 DIABETES MELLITUS WITHOUT COMPLICATION, WITHOUT LONG-TERM CURRENT USE OF INSULIN (HCC): ICD-10-CM

## 2024-05-08 NOTE — TELEPHONE ENCOUNTER
Received an Rx fax sheet from [facility] pharmacy , requesting refill on jardiance and losartan. Medication sent to provider.

## 2024-05-09 RX ORDER — LOSARTAN POTASSIUM 25 MG/1
25 TABLET ORAL DAILY
Qty: 30 TABLET | Refills: 3 | Status: SHIPPED | OUTPATIENT
Start: 2024-05-09

## 2024-05-11 DIAGNOSIS — E03.9 ACQUIRED HYPOTHYROIDISM: ICD-10-CM

## 2024-05-13 RX ORDER — LEVOTHYROXINE SODIUM 0.03 MG/1
25 TABLET ORAL DAILY
Qty: 30 TABLET | Refills: 0 | Status: SHIPPED | OUTPATIENT
Start: 2024-05-13

## 2024-06-01 LAB
ALBUMIN SERPL-MCNC: 4.5 G/DL (ref 3.5–5.7)
ALP SERPL-CCNC: 78 U/L (ref 35–120)
ALT SERPL-CCNC: 19 U/L
ANION GAP SERPL CALCULATED.3IONS-SCNC: 12 MMOL/L (ref 3–11)
AST SERPL-CCNC: 21 U/L
BILIRUB SERPL-MCNC: 1.1 MG/DL (ref 0.2–1)
BUN SERPL-MCNC: 32 MG/DL (ref 7–28)
CALCIUM SERPL-MCNC: 9.5 MG/DL (ref 8.5–10.1)
CHLORIDE SERPL-SCNC: 100 MMOL/L (ref 100–109)
CHOLEST SERPL-MCNC: 157 MG/DL
CHOLEST/HDLC SERPL: 4.8 {RATIO}
CO2 SERPL-SCNC: 26 MMOL/L (ref 21–31)
CREAT SERPL-MCNC: 1.08 MG/DL (ref 0.53–1.3)
CYTOLOGY CMNT CVX/VAG CYTO-IMP: ABNORMAL
GFR/BSA.PRED SERPLBLD CYS-BASED-ARV: 77 ML/MIN/{1.73_M2}
GLUCOSE SERPL-MCNC: 101 MG/DL (ref 65–99)
HDLC SERPL-MCNC: 33 MG/DL (ref 23–92)
LDLC SERPL CALC-MCNC: 53 MG/DL
NONHDLC SERPL-MCNC: 124 MG/DL
POTASSIUM SERPL-SCNC: 4.4 MMOL/L (ref 3.5–5.2)
PROT SERPL-MCNC: 7.5 G/DL (ref 6.3–8.3)
SODIUM SERPL-SCNC: 138 MMOL/L (ref 135–145)
TRIGL SERPL-MCNC: 354 MG/DL

## 2024-06-03 ENCOUNTER — OFFICE VISIT (OUTPATIENT)
Dept: FAMILY MEDICINE CLINIC | Facility: CLINIC | Age: 64
End: 2024-06-03

## 2024-06-03 VITALS
WEIGHT: 208 LBS | HEIGHT: 74 IN | RESPIRATION RATE: 18 BRPM | OXYGEN SATURATION: 97 % | SYSTOLIC BLOOD PRESSURE: 116 MMHG | TEMPERATURE: 98.4 F | DIASTOLIC BLOOD PRESSURE: 70 MMHG | BODY MASS INDEX: 26.69 KG/M2 | HEART RATE: 62 BPM

## 2024-06-03 DIAGNOSIS — I10 HTN (HYPERTENSION), BENIGN: ICD-10-CM

## 2024-06-03 DIAGNOSIS — E78.2 MIXED HYPERLIPIDEMIA: ICD-10-CM

## 2024-06-03 DIAGNOSIS — E03.9 ACQUIRED HYPOTHYROIDISM: ICD-10-CM

## 2024-06-03 DIAGNOSIS — E11.9 TYPE 2 DIABETES MELLITUS WITHOUT COMPLICATION, WITHOUT LONG-TERM CURRENT USE OF INSULIN (HCC): Primary | ICD-10-CM

## 2024-06-03 DIAGNOSIS — I48.0 PAROXYSMAL ATRIAL FIBRILLATION (HCC): ICD-10-CM

## 2024-06-03 DIAGNOSIS — Z87.39 HISTORY OF GOUT: ICD-10-CM

## 2024-06-03 PROCEDURE — 99213 OFFICE O/P EST LOW 20 MIN: CPT

## 2024-06-03 RX ORDER — METOPROLOL SUCCINATE 25 MG/1
25 TABLET, EXTENDED RELEASE ORAL DAILY
Qty: 90 TABLET | Refills: 1 | Status: SHIPPED | OUTPATIENT
Start: 2024-06-03

## 2024-06-03 RX ORDER — LOSARTAN POTASSIUM 25 MG/1
25 TABLET ORAL DAILY
Qty: 90 TABLET | Refills: 3 | Status: SHIPPED | OUTPATIENT
Start: 2024-06-03

## 2024-06-03 RX ORDER — ALLOPURINOL 300 MG/1
300 TABLET ORAL DAILY
Qty: 90 TABLET | Refills: 1 | Status: SHIPPED | OUTPATIENT
Start: 2024-06-03

## 2024-06-03 RX ORDER — LEVOTHYROXINE SODIUM 0.03 MG/1
25 TABLET ORAL DAILY
Qty: 90 TABLET | Refills: 0 | Status: SHIPPED | OUTPATIENT
Start: 2024-06-03

## 2024-06-03 RX ORDER — ROSUVASTATIN CALCIUM 20 MG/1
20 TABLET, COATED ORAL DAILY
Qty: 90 TABLET | Refills: 1 | Status: SHIPPED | OUTPATIENT
Start: 2024-06-03

## 2024-06-03 NOTE — PROGRESS NOTES
Assessment/Plan:         Problem List Items Addressed This Visit     HTN (hypertension), benign     Under control.  Continue losartan and metoprolol.  We will continue to monitor         Relevant Medications    losartan (COZAAR) 25 mg tablet    metoprolol succinate (TOPROL-XL) 25 mg 24 hr tablet    Mixed hyperlipidemia     Under control.  Continue rosuvastatin.  We will continue to monitor.          Relevant Medications    rosuvastatin (CRESTOR) 20 MG tablet    Type 2 diabetes mellitus without complication, without long-term current use of insulin (HCC) - Primary       Lab Results   Component Value Date    HGBA1C 7.4 (H) 12/16/2023   Under reasonable control.    Continue current medication.    We will re-evaluate at next office visit.           Relevant Medications    metFORMIN (GLUCOPHAGE) 1000 MG tablet    History of gout     Under control.    Continue current medication.    We will re-evaluate at next office visit.           Relevant Medications    allopurinol (ZYLOPRIM) 300 mg tablet    Paroxysmal atrial fibrillation (HCC)     Under control.  Continue current medication including anticoagulation medication to prevent stroke.  We will reevaluate at next office visit.  Has been followed by cardiologist.           Relevant Medications    metoprolol succinate (TOPROL-XL) 25 mg 24 hr tablet    Acquired hypothyroidism     Under control.  TSH in therapeutic range.  Continue same dose of levothyroxine.  We will continue to monitor         Relevant Medications    levothyroxine 25 mcg tablet    metoprolol succinate (TOPROL-XL) 25 mg 24 hr tablet         Subjective:      Patient ID: Lv Lobato is a 64 y.o. male.    Patient here for review of chronic medical problems and  the labs and imaging if it is applicable.  Currently has no specific complaints other than mentioned in the review of systems  Denies chest pain, SOB, cough, abdominal pain, nausea, vomiting, fever, chills, lightheadedness, dizziness,headache,  tingling or numbness.No bowel or bladder problem.          The following portions of the patient's history were reviewed and updated as appropriate:   Past Medical History:  He has a past medical history of Allergic rhinitis, Carotid stenosis, Colon polyps, Degenerative joint disease, Diabetes mellitus (HCC), Diverticulitis, Glaucoma, Gout, Hyperlipidemia, Kidney stones, Obesity, Pericarditis, and SVT (supraventricular tachycardia).,  _______________________________________________________________________  Medical Problems:  does not have any pertinent problems on file.,  _______________________________________________________________________  Past Surgical History:   has a past surgical history that includes Retinal detachment surgery (Left); Colonoscopy; Cardiac surgery; and Cataract extraction (Bilateral).,  _______________________________________________________________________  Family History:  family history includes Arthritis in his mother; Coronary artery disease in his brother and mother; Diabetes in his mother; Heart attack in his father, paternal grandfather, and paternal grandmother.,  _______________________________________________________________________  Social History:   reports that he has never smoked. He has been exposed to tobacco smoke. He has never used smokeless tobacco. He reports that he does not currently use alcohol. He reports that he does not use drugs.,  _______________________________________________________________________  Allergies:  has No Known Allergies..  _______________________________________________________________________  Current Outpatient Medications   Medication Sig Dispense Refill   • allopurinol (ZYLOPRIM) 300 mg tablet Take 1 tablet (300 mg total) by mouth daily 90 tablet 1   • aspirin 81 mg chewable tablet Daily     • Cholecalciferol (Vitamin D3) 1.25 MG (29394 UT) TABS Daily     • levothyroxine 25 mcg tablet Take 1 tablet (25 mcg total) by mouth daily 90 tablet 0  "  • losartan (COZAAR) 25 mg tablet Take 1 tablet (25 mg total) by mouth daily 90 tablet 3   • metFORMIN (GLUCOPHAGE) 1000 MG tablet Take 1 tablet (1,000 mg total) by mouth 2 (two) times a day with meals 180 tablet 1   • metoprolol succinate (TOPROL-XL) 25 mg 24 hr tablet Take 1 tablet (25 mg total) by mouth daily 90 tablet 1   • rosuvastatin (CRESTOR) 20 MG tablet Take 1 tablet (20 mg total) by mouth daily 90 tablet 1     No current facility-administered medications for this visit.     _______________________________________________________________________  Review of Systems   Constitutional:  Negative for chills, fatigue and fever.   HENT:  Negative for congestion, ear pain, rhinorrhea, sneezing and sore throat.    Eyes:  Positive for visual disturbance. Negative for pain, discharge, redness and itching.   Respiratory:  Negative for cough, chest tightness and shortness of breath.    Cardiovascular:  Negative for chest pain, palpitations and leg swelling.   Gastrointestinal:  Negative for abdominal pain, blood in stool, diarrhea, nausea and vomiting.   Endocrine: Negative for cold intolerance, heat intolerance, polydipsia, polyphagia and polyuria.   Genitourinary:  Negative for dysuria, frequency, hematuria and urgency.   Musculoskeletal:  Negative for arthralgias, back pain and myalgias.   Skin:  Positive for wound (Laceration with stitches left forehead above the eyebrow). Negative for color change and rash.   Neurological:  Negative for dizziness, weakness, light-headedness, numbness and headaches.   Hematological:  Does not bruise/bleed easily.   Psychiatric/Behavioral:  Negative for agitation, behavioral problems and confusion.          Objective:  Vitals:    06/03/24 0845   BP: 116/70   BP Location: Right arm   Patient Position: Sitting   Cuff Size: Standard   Pulse: 62   Resp: 18   Temp: 98.4 °F (36.9 °C)   TempSrc: Temporal   SpO2: 97%   Weight: 94.3 kg (208 lb)   Height: 6' 2\" (1.88 m)     Body mass index " is 26.71 kg/m².     Physical Exam  Vitals and nursing note reviewed.   Constitutional:       General: He is not in acute distress.     Appearance: Normal appearance. He is not ill-appearing, toxic-appearing or diaphoretic.   HENT:      Head: Normocephalic and atraumatic.      Nose: Nose normal. No congestion.      Mouth/Throat:      Mouth: Mucous membranes are moist.   Eyes:      General: No scleral icterus.        Right eye: No discharge.         Left eye: No discharge.      Extraocular Movements: Extraocular movements intact.      Conjunctiva/sclera: Conjunctivae normal.      Pupils: Pupils are equal, round, and reactive to light.   Cardiovascular:      Rate and Rhythm: Normal rate and regular rhythm.      Pulses: Normal pulses.           Dorsalis pedis pulses are 2+ on the right side and 2+ on the left side.        Posterior tibial pulses are 2+ on the right side and 2+ on the left side.      Heart sounds: Normal heart sounds. No murmur heard.     No gallop.   Pulmonary:      Effort: Pulmonary effort is normal. No respiratory distress.      Breath sounds: Normal breath sounds. No wheezing, rhonchi or rales.   Abdominal:      General: Abdomen is flat. Bowel sounds are normal. There is no distension.      Palpations: Abdomen is soft.      Tenderness: There is no abdominal tenderness. There is no right CVA tenderness, left CVA tenderness or guarding.   Musculoskeletal:         General: No swelling or tenderness. Normal range of motion.      Cervical back: Normal range of motion and neck supple. No rigidity.      Right lower leg: No edema.      Left lower leg: No edema.   Feet:      Right foot:      Skin integrity: No ulcer, skin breakdown, erythema, warmth, callus or dry skin.      Left foot:      Skin integrity: No ulcer, skin breakdown, erythema, warmth, callus or dry skin.   Lymphadenopathy:      Cervical: No cervical adenopathy.   Skin:     General: Skin is warm.      Capillary Refill: Capillary refill takes 2  to 3 seconds.      Coloration: Skin is not jaundiced.      Findings: No bruising or rash.   Neurological:      General: No focal deficit present.      Mental Status: He is alert and oriented to person, place, and time. Mental status is at baseline.      Motor: No weakness.      Gait: Gait normal.   Psychiatric:         Mood and Affect: Mood normal.         Behavior: Behavior normal.

## 2024-06-04 ENCOUNTER — TELEPHONE (OUTPATIENT)
Dept: ADMINISTRATIVE | Facility: OTHER | Age: 64
End: 2024-06-04

## 2024-06-04 NOTE — TELEPHONE ENCOUNTER
----- Message from Nae CURRIE sent at 6/3/2024 10:20 AM EDT -----  Regarding: care gap request  06/03/24 10:20 AM    Hello, our patient attached above has had CRC: Colonoscopy completed/performed. Please assist in updating the patient chart pulling the document from the Imaging/Procedure Tab. The date of service is 3/05/2024.     Thank you,  Nae [facility]

## 2024-06-04 NOTE — TELEPHONE ENCOUNTER
Upon review of the In Basket request we  found the  already up to date with the requested item, DOS 2014    Any additional questions or concerns should be emailed to the Practice Liaisons via the appropriate education email address, please do not reply via In Basket.    Thank you  Terrell Mart MA   [facility]

## 2024-06-10 ENCOUNTER — TELEPHONE (OUTPATIENT)
Dept: FAMILY MEDICINE CLINIC | Facility: CLINIC | Age: 64
End: 2024-06-10

## 2024-06-10 NOTE — TELEPHONE ENCOUNTER
[facility]- requesting multiple scripts to be written separately       Please review attached doc

## 2024-06-11 NOTE — TELEPHONE ENCOUNTER
Pt's wife calling to make sure this was faxed back to the pharmacy.  Per Nae's note, informed pt's wife that it was completed and faxed back.

## 2024-06-21 ENCOUNTER — TELEPHONE (OUTPATIENT)
Age: 64
End: 2024-06-21

## 2024-06-21 NOTE — TELEPHONE ENCOUNTER
Received call from [facility] [employer] stating that the Patient Assistance form is missing doctors signature and dosing information on last page.    Please re fax with signature and dosing information to fax number below      Needs signature and dosing information       Re fax  [phone]

## 2024-06-21 NOTE — TELEPHONE ENCOUNTER
Spoke with wife and stated he takes jardiance 25 mg once a day before breakfast. I do see you prescribed it before but was discontinued. - ok to fax ?

## 2024-08-26 DIAGNOSIS — E03.9 ACQUIRED HYPOTHYROIDISM: ICD-10-CM

## 2024-08-27 RX ORDER — LEVOTHYROXINE SODIUM 25 UG/1
25 TABLET ORAL DAILY
Qty: 90 TABLET | Refills: 1 | Status: SHIPPED | OUTPATIENT
Start: 2024-08-27

## 2024-09-03 ENCOUNTER — TELEPHONE (OUTPATIENT)
Age: 64
End: 2024-09-03

## 2024-09-13 LAB
CREAT ?TM UR-SCNC: 78.9 UMOL/L
EXT ALBUMIN URINE RANDOM: 7.5
HBA1C MFR BLD HPLC: 12.5 %
MICROALBUMIN/CREAT UR: 95.1 MG/G{CREAT}

## 2024-09-19 ENCOUNTER — OFFICE VISIT (OUTPATIENT)
Age: 64
End: 2024-09-19

## 2024-09-19 VITALS
TEMPERATURE: 97.9 F | HEIGHT: 74 IN | SYSTOLIC BLOOD PRESSURE: 116 MMHG | DIASTOLIC BLOOD PRESSURE: 70 MMHG | BODY MASS INDEX: 26.62 KG/M2 | HEART RATE: 81 BPM | RESPIRATION RATE: 16 BRPM | OXYGEN SATURATION: 96 % | WEIGHT: 207.4 LBS

## 2024-09-19 DIAGNOSIS — I10 HTN (HYPERTENSION), BENIGN: ICD-10-CM

## 2024-09-19 DIAGNOSIS — E78.2 MIXED HYPERLIPIDEMIA: ICD-10-CM

## 2024-09-19 DIAGNOSIS — E03.9 ACQUIRED HYPOTHYROIDISM: ICD-10-CM

## 2024-09-19 DIAGNOSIS — I48.0 PAROXYSMAL ATRIAL FIBRILLATION (HCC): ICD-10-CM

## 2024-09-19 DIAGNOSIS — E11.9 TYPE 2 DIABETES MELLITUS WITHOUT COMPLICATION, WITHOUT LONG-TERM CURRENT USE OF INSULIN (HCC): Primary | ICD-10-CM

## 2024-09-19 PROCEDURE — 99213 OFFICE O/P EST LOW 20 MIN: CPT

## 2024-09-19 NOTE — PROGRESS NOTES
Ambulatory Visit  Name: Lv Lobato      : 1960      MRN: [id number]  Encounter Provider: Cheli Thakkar MD  Encounter Date: 2024   Encounter department: [facility] PRIMARY CARE    Assessment & Plan  Type 2 diabetes mellitus without complication, without long-term current use of insulin (HCC)    Lab Results   Component Value Date    HGBA1C 7.4 (H) 2023   Last hemoglobin A1c which was done on 2024 is 12.5  Not Under reasonable control.    As patient insurance does not cover Rybelsus anymore  Patient is going to check if any of GLP-1 covered under his insurance and get back to me meanwhile advised him to watch diet much more carefully and increase exercise  Continue current medication.    We will re-evaluate at next office visit.           Acquired hypothyroidism  Under control.  TSH in therapeutic range.  Continue same dose of levothyroxine.  We will continue to monitor         HTN (hypertension), benign  Under control.  Continue losartan and metoprolol.  We will continue to monitor         Mixed hyperlipidemia  Under control.  Continue rosuvastatin.  We will continue to monitor.            Paroxysmal atrial fibrillation (HCC)  Under control.  Continue current medication including anticoagulation medication to prevent stroke.  We will reevaluate at next office visit.  Has been followed by cardiologist.              History of Present Illness     Patient here for review of chronic medical problems and  the labs and imaging if it is applicable.  Currently has no specific complaints other than mentioned in the review of systems  Denies chest pain, SOB, cough, abdominal pain, nausea, vomiting, fever, chills, lightheadedness, dizziness,headache, tingling or numbness.No bowel or bladder problem.          History obtained from : patient  Review of Systems  Medical History Reviewed by provider this encounter:  Tobacco  Allergies  Meds  Problems  Med Hx  Surg Hx  Fam  Hx       Past Medical History   Past Medical History:   Diagnosis Date    Allergic rhinitis     Carotid stenosis     Colon polyps     Degenerative joint disease     Diabetes mellitus (HCC)     Diverticulitis     Glaucoma     Gout     Hyperlipidemia     Kidney stones     Obesity     Pericarditis     SVT (supraventricular tachycardia)     possibly or afib     Past Surgical History:   Procedure Laterality Date    CARDIAC SURGERY      CATARACT EXTRACTION Bilateral     Left eye 4/30/2021 and right eye 5/3/2021    COLONOSCOPY      RETINAL DETACHMENT SURGERY Left      Family History   Problem Relation Age of Onset    Coronary artery disease Mother     Diabetes Mother     Arthritis Mother         Rheumatoid    Heart attack Father     Coronary artery disease Brother     Heart attack Paternal Grandmother     Heart attack Paternal Grandfather      Current Outpatient Medications on File Prior to Visit   Medication Sig Dispense Refill    allopurinol (ZYLOPRIM) 300 mg tablet Take 1 tablet (300 mg total) by mouth daily 90 tablet 1    aspirin 81 mg chewable tablet Daily      Cholecalciferol (Vitamin D3) 1.25 MG (21406 UT) TABS Daily      Empagliflozin 25 MG TABS Take 25 mg by mouth every morning      levothyroxine 25 mcg tablet TAKE 1 TABLET BY MOUTH ONCE DAILY 90 tablet 1    losartan (COZAAR) 25 mg tablet Take 1 tablet (25 mg total) by mouth daily 90 tablet 3    metFORMIN (GLUCOPHAGE) 1000 MG tablet Take 1 tablet (1,000 mg total) by mouth 2 (two) times a day with meals 180 tablet 1    metoprolol succinate (TOPROL-XL) 25 mg 24 hr tablet Take 1 tablet (25 mg total) by mouth daily 90 tablet 1    rosuvastatin (CRESTOR) 20 MG tablet Take 1 tablet (20 mg total) by mouth daily 90 tablet 1     No current facility-administered medications on file prior to visit.   No Known Allergies   Current Outpatient Medications on File Prior to Visit   Medication Sig Dispense Refill    allopurinol (ZYLOPRIM) 300 mg tablet Take 1 tablet (300 mg total)  "by mouth daily 90 tablet 1    aspirin 81 mg chewable tablet Daily      Cholecalciferol (Vitamin D3) 1.25 MG (38305 UT) TABS Daily      Empagliflozin 25 MG TABS Take 25 mg by mouth every morning      levothyroxine 25 mcg tablet TAKE 1 TABLET BY MOUTH ONCE DAILY 90 tablet 1    losartan (COZAAR) 25 mg tablet Take 1 tablet (25 mg total) by mouth daily 90 tablet 3    metFORMIN (GLUCOPHAGE) 1000 MG tablet Take 1 tablet (1,000 mg total) by mouth 2 (two) times a day with meals 180 tablet 1    metoprolol succinate (TOPROL-XL) 25 mg 24 hr tablet Take 1 tablet (25 mg total) by mouth daily 90 tablet 1    rosuvastatin (CRESTOR) 20 MG tablet Take 1 tablet (20 mg total) by mouth daily 90 tablet 1     No current facility-administered medications on file prior to visit.      Social History     Tobacco Use    Smoking status: Never     Passive exposure: Past    Smokeless tobacco: Never   Vaping Use    Vaping status: Never Used   Substance and Sexual Activity    Alcohol use: Not Currently    Drug use: Never    Sexual activity: Not on file         Objective     /70 (BP Location: Left arm, Patient Position: Sitting, Cuff Size: Standard)   Pulse 81   Temp 97.9 °F (36.6 °C) (Tympanic)   Resp 16   Ht 6' 2\" (1.88 m)   Wt 94.1 kg (207 lb 6.4 oz)   SpO2 96%   BMI 26.63 kg/m²     Physical Exam  Vitals and nursing note reviewed.   Constitutional:       General: He is not in acute distress.     Appearance: Normal appearance. He is well-developed and normal weight. He is not ill-appearing, toxic-appearing or diaphoretic.   HENT:      Head: Normocephalic and atraumatic.      Nose: Nose normal. No congestion or rhinorrhea.      Mouth/Throat:      Mouth: Mucous membranes are moist.      Pharynx: Oropharynx is clear.   Eyes:      General: No scleral icterus.        Right eye: No discharge.         Left eye: No discharge.      Extraocular Movements: Extraocular movements intact.      Conjunctiva/sclera: Conjunctivae normal.      Pupils: " Pupils are equal, round, and reactive to light.   Cardiovascular:      Rate and Rhythm: Normal rate and regular rhythm.      Pulses: Normal pulses.      Heart sounds: Normal heart sounds. No murmur heard.     No gallop.   Pulmonary:      Effort: Pulmonary effort is normal. No respiratory distress.      Breath sounds: Normal breath sounds. No wheezing or rales.   Abdominal:      General: Abdomen is flat. Bowel sounds are normal. There is no distension.      Palpations: Abdomen is soft.      Tenderness: There is no abdominal tenderness. There is no right CVA tenderness, left CVA tenderness or guarding.   Musculoskeletal:         General: No swelling or tenderness. Normal range of motion.      Cervical back: Normal range of motion and neck supple. No rigidity.      Right lower leg: No edema.      Left lower leg: No edema.   Lymphadenopathy:      Cervical: No cervical adenopathy.   Skin:     General: Skin is warm and dry.      Capillary Refill: Capillary refill takes 2 to 3 seconds.      Coloration: Skin is not jaundiced or pale.   Neurological:      General: No focal deficit present.      Mental Status: He is alert and oriented to person, place, and time. Mental status is at baseline.      Sensory: No sensory deficit.      Motor: No weakness.   Psychiatric:         Mood and Affect: Mood normal.         Behavior: Behavior normal.

## 2024-09-19 NOTE — ASSESSMENT & PLAN NOTE
Lab Results   Component Value Date    HGBA1C 7.4 (H) 12/16/2023   Last hemoglobin A1c which was done on September 13, 2024 is 12.5  Not Under reasonable control.    As patient insurance does not cover Rybelsus anymore  Patient is going to check if any of GLP-1 covered under his insurance and get back to me meanwhile advised him to watch diet much more carefully and increase exercise  Continue current medication.    We will re-evaluate at next office visit.

## 2024-12-06 DIAGNOSIS — E11.9 TYPE 2 DIABETES MELLITUS WITHOUT COMPLICATION, WITHOUT LONG-TERM CURRENT USE OF INSULIN (HCC): ICD-10-CM

## 2024-12-06 DIAGNOSIS — Z87.39 HISTORY OF GOUT: ICD-10-CM

## 2024-12-06 DIAGNOSIS — I10 HTN (HYPERTENSION), BENIGN: ICD-10-CM

## 2024-12-06 DIAGNOSIS — E03.9 ACQUIRED HYPOTHYROIDISM: ICD-10-CM

## 2024-12-06 NOTE — TELEPHONE ENCOUNTER
Reason for call:   [x] Refill   [] Prior Auth  [] Other:     Office:   [x] PCP/Provider -   [] Specialty/Provider -     Medication: allopurinol (ZYLOPRIM) 300 mg tablet     Dose/Frequency: Take 1 tablet (300 mg total) by mouth daily,     Quantity:  90 tablet     Medication: levothyroxine 25 mcg tablet     Dose/Frequency:  TAKE 1 TABLET BY MOUTH ONCE DAILY     Quantity: : 90 tablet     Medication: losartan (COZAAR) 25 mg tablet     Dose/Frequency: Take 1 tablet (25 mg total) by mouth daily     Quantity:  90 tablet     Medication: metFORMIN (GLUCOPHAGE) 1000 MG tablet     Dose/Frequency: Take 1 tablet (1,000 mg total) by mouth 2 (two) times a day with meals     Quantity: 180 tablet     Medication: metoprolol succinate (TOPROL-XL) 25 mg 24 hr tablet     Dose/Frequency:  Take 1 tablet (25 mg total) by mouth daily     Quantity: 90 tablet     Pharmacy: [id number] - [place] [address]     Does the patient have enough for 3 days?   [x] Yes   [] No - Send as HP to POD

## 2024-12-09 RX ORDER — LEVOTHYROXINE SODIUM 25 UG/1
25 TABLET ORAL DAILY
Qty: 30 TABLET | Refills: 0 | Status: SHIPPED | OUTPATIENT
Start: 2024-12-09

## 2024-12-09 RX ORDER — ALLOPURINOL 300 MG/1
300 TABLET ORAL DAILY
Qty: 90 TABLET | Refills: 1 | Status: SHIPPED | OUTPATIENT
Start: 2024-12-09

## 2024-12-09 RX ORDER — LOSARTAN POTASSIUM 25 MG/1
25 TABLET ORAL DAILY
Qty: 90 TABLET | Refills: 1 | Status: SHIPPED | OUTPATIENT
Start: 2024-12-09

## 2024-12-09 RX ORDER — METOPROLOL SUCCINATE 25 MG/1
25 TABLET, EXTENDED RELEASE ORAL DAILY
Qty: 90 TABLET | Refills: 1 | Status: SHIPPED | OUTPATIENT
Start: 2024-12-09

## 2025-01-06 DIAGNOSIS — E78.2 MIXED HYPERLIPIDEMIA: ICD-10-CM

## 2025-01-06 DIAGNOSIS — E03.9 ACQUIRED HYPOTHYROIDISM: ICD-10-CM

## 2025-01-06 RX ORDER — LEVOTHYROXINE SODIUM 25 UG/1
25 TABLET ORAL DAILY
Qty: 30 TABLET | Refills: 0 | Status: SHIPPED | OUTPATIENT
Start: 2025-01-06

## 2025-01-06 RX ORDER — ROSUVASTATIN CALCIUM 20 MG/1
20 TABLET, COATED ORAL DAILY
Qty: 90 TABLET | Refills: 1 | Status: SHIPPED | OUTPATIENT
Start: 2025-01-06

## 2025-01-06 NOTE — TELEPHONE ENCOUNTER
Patient is requesting a 90 day refill.    Reason for call:   [x] Refill   [] Prior Auth  [] Other:     Office:   [x] PCP/Provider - Cheli Thakkar MD  [] Specialty/Provider -     Medication:   levothyroxine 25 mcg tablet   rosuvastatin (CRESTOR) 20 MG tablet     Dose/Frequency:   25 mcg, Oral, Daily   20 mg, Oral, Daily     Quantity:   30  90    Pharmacy: [id number] - [place]  [address] [facility]     Does the patient have enough for 3 days?   [x] Yes   [] No - Send as HP to POD

## 2025-03-10 NOTE — ED ATTENDING ATTESTATION
9/5/2020  Mariposa Villegas DO, saw and evaluated the patient  I have discussed the patient with the resident/non-physician practitioner and agree with the resident's/non-physician practitioner's findings, Plan of Care, and MDM as documented in the resident's/non-physician practitioner's note, except where noted  All available labs and Radiology studies were reviewed  I was present for key portions of any procedure(s) performed by the resident/non-physician practitioner and I was immediately available to provide assistance  At this point I agree with the current assessment done in the Emergency Department  I have conducted an independent evaluation of this patient a history and physical is as follows:  31-year-old male presented to the emergency department complaining of flank pain  No urinary symptoms  The patient had flank tenderness on exam       ED Course  ED Course as of Sep 06 0153   Johnnie Palma Sep 06, 2020   0152 This is a 31-year-old male presented to the emergency department complaining of flank pain  The patient had significant relief of his pain after Toradol  Patient had lab work significant for white count of 11 58  A UA that was nitrate and leuks negative  The patient had a CT scan that showed a 4 mm stone at the UVJ  The patient was discharged with follow-up to Urology              Critical Care Time  Procedures Called and advised to restart cosentyx.

## 2025-03-12 DIAGNOSIS — E78.2 MIXED HYPERLIPIDEMIA: Primary | ICD-10-CM

## 2025-03-12 DIAGNOSIS — E55.9 VITAMIN D DEFICIENCY: ICD-10-CM

## 2025-03-12 DIAGNOSIS — E11.9 TYPE 2 DIABETES MELLITUS WITHOUT COMPLICATION, WITHOUT LONG-TERM CURRENT USE OF INSULIN (HCC): ICD-10-CM

## 2025-03-12 DIAGNOSIS — Z12.5 SCREENING FOR PROSTATE CANCER: ICD-10-CM

## 2025-03-12 DIAGNOSIS — I10 HTN (HYPERTENSION), BENIGN: ICD-10-CM

## 2025-04-02 ENCOUNTER — TELEPHONE (OUTPATIENT)
Age: 65
End: 2025-04-02

## 2025-04-02 NOTE — TELEPHONE ENCOUNTER
Patient is waiting for his medicare to start- he is scheduled for June for AP       ( Prev HgA1c was 12.5)

## 2025-04-04 DIAGNOSIS — E11.9 TYPE 2 DIABETES MELLITUS WITHOUT COMPLICATION, WITHOUT LONG-TERM CURRENT USE OF INSULIN (HCC): Primary | ICD-10-CM

## 2025-04-07 RX ORDER — EMPAGLIFLOZIN 25 MG/1
TABLET, FILM COATED ORAL
Qty: 90 TABLET | Refills: 1 | Status: SHIPPED | OUTPATIENT
Start: 2025-04-07

## 2025-05-27 ENCOUNTER — TELEPHONE (OUTPATIENT)
Age: 65
End: 2025-05-27

## 2025-05-27 NOTE — TELEPHONE ENCOUNTER
Patient has active blood work , called if patient will be getting it done or not. We will see pt at upcoming apt.

## 2025-05-27 NOTE — TELEPHONE ENCOUNTER
Patient is returning a call from the office. He states his appointment was rescheduled by the office and he cannot get his labs done before his scheduled appointment

## 2025-05-28 ENCOUNTER — OFFICE VISIT (OUTPATIENT)
Age: 65
End: 2025-05-28
Payer: COMMERCIAL

## 2025-05-28 VITALS
OXYGEN SATURATION: 98 % | HEART RATE: 60 BPM | BODY MASS INDEX: 26.44 KG/M2 | HEIGHT: 74 IN | RESPIRATION RATE: 18 BRPM | SYSTOLIC BLOOD PRESSURE: 119 MMHG | WEIGHT: 206 LBS | DIASTOLIC BLOOD PRESSURE: 69 MMHG | TEMPERATURE: 97.9 F

## 2025-05-28 DIAGNOSIS — E78.2 MIXED HYPERLIPIDEMIA: ICD-10-CM

## 2025-05-28 DIAGNOSIS — E11.9 TYPE 2 DIABETES MELLITUS WITHOUT COMPLICATION, WITHOUT LONG-TERM CURRENT USE OF INSULIN (HCC): ICD-10-CM

## 2025-05-28 DIAGNOSIS — E03.9 ACQUIRED HYPOTHYROIDISM: ICD-10-CM

## 2025-05-28 DIAGNOSIS — Z00.00 WELCOME TO MEDICARE PREVENTIVE VISIT: ICD-10-CM

## 2025-05-28 DIAGNOSIS — I48.0 PAROXYSMAL ATRIAL FIBRILLATION (HCC): ICD-10-CM

## 2025-05-28 DIAGNOSIS — E11.9 TYPE 2 DIABETES MELLITUS WITHOUT COMPLICATION, WITHOUT LONG-TERM CURRENT USE OF INSULIN (HCC): Primary | ICD-10-CM

## 2025-05-28 DIAGNOSIS — I10 HTN (HYPERTENSION), BENIGN: ICD-10-CM

## 2025-05-28 DIAGNOSIS — Z87.39 HISTORY OF GOUT: ICD-10-CM

## 2025-05-28 PROCEDURE — G0403 EKG FOR INITIAL PREVENT EXAM: HCPCS

## 2025-05-28 PROCEDURE — 99214 OFFICE O/P EST MOD 30 MIN: CPT

## 2025-05-28 PROCEDURE — G2211 COMPLEX E/M VISIT ADD ON: HCPCS

## 2025-05-28 PROCEDURE — G0402 INITIAL PREVENTIVE EXAM: HCPCS

## 2025-05-28 RX ORDER — LOSARTAN POTASSIUM 25 MG/1
25 TABLET ORAL DAILY
Qty: 90 TABLET | Refills: 1 | Status: SHIPPED | OUTPATIENT
Start: 2025-05-28

## 2025-05-28 RX ORDER — METOPROLOL SUCCINATE 25 MG/1
25 TABLET, EXTENDED RELEASE ORAL DAILY
Qty: 90 TABLET | Refills: 1 | Status: SHIPPED | OUTPATIENT
Start: 2025-05-28

## 2025-05-28 RX ORDER — ROSUVASTATIN CALCIUM 20 MG/1
20 TABLET, COATED ORAL DAILY
Qty: 90 TABLET | Refills: 1 | Status: SHIPPED | OUTPATIENT
Start: 2025-05-28

## 2025-05-28 RX ORDER — ALLOPURINOL 300 MG/1
300 TABLET ORAL DAILY
Qty: 90 TABLET | Refills: 1 | Status: SHIPPED | OUTPATIENT
Start: 2025-05-28

## 2025-05-28 RX ORDER — LEVOTHYROXINE SODIUM 25 UG/1
25 TABLET ORAL DAILY
Qty: 90 TABLET | Refills: 1 | Status: SHIPPED | OUTPATIENT
Start: 2025-05-28

## 2025-05-28 NOTE — PATIENT INSTRUCTIONS
Medicare Preventive Visit Patient Instructions  Thank you for completing your Welcome to Medicare Visit or Medicare Annual Wellness Visit today. Your next wellness visit will be due in one year (5/29/2026).  The screening/preventive services that you may require over the next 5-10 years are detailed below. Some tests may not apply to you based off risk factors and/or age. Screening tests ordered at today's visit but not completed yet may show as past due. Also, please note that scanned in results may not display below.  Preventive Screenings:  Service Recommendations Previous Testing/Comments   Colorectal Cancer Screening  Colonoscopy    Fecal Occult Blood Test (FOBT)/Fecal Immunochemical Test (FIT)  Fecal DNA/Cologuard Test  Flexible Sigmoidoscopy Age: 45-75 years old   Colonoscopy: every 10 years (May be performed more frequently if at higher risk)  OR  FOBT/FIT: every 1 year  OR  Cologuard: every 3 years  OR  Sigmoidoscopy: every 5 years  Screening may be recommended earlier than age 45 if at higher risk for colorectal cancer. Also, an individualized decision between you and your healthcare provider will decide whether screening between the ages of 76-85 would be appropriate. Colonoscopy: 06/20/2014  FOBT/FIT: Not on file  Cologuard: Not on file  Sigmoidoscopy: Not on file    Screening Current     Prostate Cancer Screening Individualized decision between patient and health care provider in men between ages of 55-69   Medicare will cover every 12 months beginning on the day after your 50th birthday PSA: 0.91 ng/mL           Hepatitis C Screening Once for adults born between 1945 and 1965  More frequently in patients at high risk for Hepatitis C Hep C Antibody: 12/07/2015    Screening Current   Diabetes Screening 1-2 times per year if you're at risk for diabetes or have pre-diabetes Fasting glucose: 126 mg/dL (12/16/2023)  A1C: 12.5 (9/13/2024)  Screening Not Indicated  History Diabetes   Cholesterol Screening Once  every 5 years if you don't have a lipid disorder. May order more often based on risk factors. Lipid panel: 06/01/2024  Screening Not Indicated  History Lipid Disorder      Other Preventive Screenings Covered by Medicare:  Abdominal Aortic Aneurysm (AAA) Screening: covered once if your at risk. You're considered to be at risk if you have a family history of AAA or a male between the age of 65-75 who smoking at least 100 cigarettes in your lifetime.  Lung Cancer Screening: covers low dose CT scan once per year if you meet all of the following conditions: (1) Age 55-77; (2) No signs or symptoms of lung cancer; (3) Current smoker or have quit smoking within the last 15 years; (4) You have a tobacco smoking history of at least 20 pack years (packs per day x number of years you smoked); (5) You get a written order from a healthcare provider.  Glaucoma Screening: covered annually if you're considered high risk: (1) You have diabetes OR (2) Family history of glaucoma OR (3)  aged 50 and older OR (4)  American aged 65 and older  Osteoporosis Screening: covered every 2 years if you meet one of the following conditions: (1) Have a vertebral abnormality; (2) On glucocorticoid therapy for more than 3 months; (3) Have primary hyperparathyroidism; (4) On osteoporosis medications and need to assess response to drug therapy.  HIV Screening: covered annually if you're between the age of 15-65. Also covered annually if you are younger than 15 and older than 65 with risk factors for HIV infection. For pregnant patients, it is covered up to 3 times per pregnancy.    Immunizations:  Immunization Recommendations   Influenza Vaccine Annual influenza vaccination during flu season is recommended for all persons aged >= 6 months who do not have contraindications   Pneumococcal Vaccine   * Pneumococcal conjugate vaccine = PCV13 (Prevnar 13), PCV15 (Vaxneuvance), PCV20 (Prevnar 20)  * Pneumococcal polysaccharide vaccine  = PPSV23 (Pneumovax) Adults 19-65 yo with certain risk factors or if 65+ yo  If never received any pneumonia vaccine: recommend Prevnar 20 (PCV20)  Give PCV20 if previously received 1 dose of PCV13 or PPSV23   Hepatitis B Vaccine 3 dose series if at intermediate or high risk (ex: diabetes, end stage renal disease, liver disease)   Respiratory syncytial virus (RSV) Vaccine - COVERED BY MEDICARE PART D  * RSVPreF3 (Arexvy) CDC recommends that adults 60 years of age and older may receive a single dose of RSV vaccine using shared clinical decision-making (SCDM)   Tetanus (Td) Vaccine - COST NOT COVERED BY MEDICARE PART B Following completion of primary series, a booster dose should be given every 10 years to maintain immunity against tetanus. Td may also be given as tetanus wound prophylaxis.   Tdap Vaccine - COST NOT COVERED BY MEDICARE PART B Recommended at least once for all adults. For pregnant patients, recommended with each pregnancy.   Shingles Vaccine (Shingrix) - COST NOT COVERED BY MEDICARE PART B  2 shot series recommended in those 19 years and older who have or will have weakened immune systems or those 50 years and older     Health Maintenance Due:      Topic Date Due   • HIV Screening  Never done   • Colorectal Cancer Screening  03/06/2024   • Hepatitis C Screening  Completed     Immunizations Due:      Topic Date Due   • Pneumococcal Vaccine: 65+ Years (1 of 2 - PCV) Never done   • DTaP,Tdap,and Td Vaccines (1 - Tdap) Never done   • COVID-19 Vaccine (3 - 2024-25 season) 09/01/2024     Advance Directives   What are advance directives?  Advance directives are legal documents that state your wishes and plans for medical care. These plans are made ahead of time in case you lose your ability to make decisions for yourself. Advance directives can apply to any medical decision, such as the treatments you want, and if you want to donate organs.   What are the types of advance directives?  There are many types of  advance directives, and each state has rules about how to use them. You may choose a combination of any of the following:  Living will:  This is a written record of the treatment you want. You can also choose which treatments you do not want, which to limit, and which to stop at a certain time. This includes surgery, medicine, IV fluid, and tube feedings.   Durable power of  for healthcare (DPAHC):  This is a written record that states who you want to make healthcare choices for you when you are unable to make them for yourself. This person, called a proxy, is usually a family member or a friend. You may choose more than 1 proxy.  Do not resuscitate (DNR) order:  A DNR order is used in case your heart stops beating or you stop breathing. It is a request not to have certain forms of treatment, such as CPR. A DNR order may be included in other types of advance directives.  Medical directive:  This covers the care that you want if you are in a coma, near death, or unable to make decisions for yourself. You can list the treatments you want for each condition. Treatment may include pain medicine, surgery, blood transfusions, dialysis, IV or tube feedings, and a ventilator (breathing machine).  Values history:  This document has questions about your views, beliefs, and how you feel and think about life. This information can help others choose the care that you would choose.  Why are advance directives important?  An advance directive helps you control your care. Although spoken wishes may be used, it is better to have your wishes written down. Spoken wishes can be misunderstood, or not followed. Treatments may be given even if you do not want them. An advance directive may make it easier for your family to make difficult choices about your care.   Weight Management   Why it is important to manage your weight:  Being overweight increases your risk of health conditions such as heart disease, high blood pressure,  type 2 diabetes, and certain types of cancer. It can also increase your risk for osteoarthritis, sleep apnea, and other respiratory problems. Aim for a slow, steady weight loss. Even a small amount of weight loss can lower your risk of health problems.  How to lose weight safely:  A safe and healthy way to lose weight is to eat fewer calories and get regular exercise. You can lose up about 1 pound a week by decreasing the number of calories you eat by 500 calories each day.   Healthy meal plan for weight management:  A healthy meal plan includes a variety of foods, contains fewer calories, and helps you stay healthy. A healthy meal plan includes the following:  Eat whole-grain foods more often.  A healthy meal plan should contain fiber. Fiber is the part of grains, fruits, and vegetables that is not broken down by your body. Whole-grain foods are healthy and provide extra fiber in your diet. Some examples of whole-grain foods are whole-wheat breads and pastas, oatmeal, brown rice, and bulgur.  Eat a variety of vegetables every day.  Include dark, leafy greens such as spinach, kale, sammie greens, and mustard greens. Eat yellow and orange vegetables such as carrots, sweet potatoes, and winter squash.   Eat a variety of fruits every day.  Choose fresh or canned fruit (canned in its own juice or light syrup) instead of juice. Fruit juice has very little or no fiber.  Eat low-fat dairy foods.  Drink fat-free (skim) milk or 1% milk. Eat fat-free yogurt and low-fat cottage cheese. Try low-fat cheeses such as mozzarella and other reduced-fat cheeses.  Choose meat and other protein foods that are low in fat.  Choose beans or other legumes such as split peas or lentils. Choose fish, skinless poultry (chicken or turkey), or lean cuts of red meat (beef or pork). Before you cook meat or poultry, cut off any visible fat.   Use less fat and oil.  Try baking foods instead of frying them. Add less fat, such as margarine, sour  cream, regular salad dressing and mayonnaise to foods. Eat fewer high-fat foods. Some examples of high-fat foods include french fries, doughnuts, ice cream, and cakes.  Eat fewer sweets.  Limit foods and drinks that are high in sugar. This includes candy, cookies, regular soda, and sweetened drinks.  Exercise:  Exercise at least 30 minutes per day on most days of the week. Some examples of exercise include walking, biking, dancing, and swimming. You can also fit in more physical activity by taking the stairs instead of the elevator or parking farther away from stores. Ask your healthcare provider about the best exercise plan for you.    © Copyright [employer] 2018 Information is for End User's use only and may not be sold, redistributed or otherwise used for commercial purposes. All illustrations and images included in CareNotes® are the copyrighted property of [employer] or [employer]

## 2025-05-28 NOTE — PROGRESS NOTES
Diabetic Foot Exam    Patient's shoes and socks removed.    Right Foot/Ankle   Right Foot Inspection  Skin Exam: skin normal, dry skin, callus and callus. Skin not intact, no warmth, no erythema, no maceration, no abnormal color, no pre-ulcer and no ulcer.     Toe Exam: ROM and strength within normal limits. No swelling, no tenderness, erythema and  no right toe deformity    Sensory   Vibration: intact  Proprioception: intact  Monofilament testing: intact    Vascular  Capillary refills: < 3 seconds  The right DP pulse is 1+. The right PT pulse is 1+.     Left Foot/Ankle  Left Foot Inspection  Skin Exam: skin normal, dry skin and callus. Skin not intact, no warmth, no erythema, no maceration, normal color, no pre-ulcer and no ulcer.     Toe Exam: ROM and strength within normal limits. No swelling, no tenderness, no erythema and no left toe deformity.     Sensory   Vibration: intact  Proprioception: intact  Monofilament testing: intact    Vascular  Capillary refills: < 3 seconds  The left DP pulse is 1+. The left PT pulse is 1+.     Assign Risk Category  No deformity present  No loss of protective sensation  No weak pulses  Risk: 0  Name: Lv Lobato      : 1960      MRN: [id number]  Encounter Provider: Cheli Thakkar MD  Encounter Date: 2025   Encounter department: [facility] PRIMARY [facility]  :  Assessment & Plan  Type 2 diabetes mellitus without complication, without long-term current use of insulin ([facility]ly [facility])    Lab Results   Component Value Date    HGBA1C 12.5 2024       Orders:    metFORMIN (GLUCOPHAGE) 1000 MG tablet; Take 1 tablet (1,000 mg total) by mouth 2 (two) times a day with meals    Acquired hypothyroidism    Orders:    levothyroxine 25 mcg tablet; Take 1 tablet (25 mcg total) by mouth daily    TSH, 3rd generation with Free T4 reflex; Future    HTN (hypertension), benign    Orders:    metoprolol succinate (TOPROL-XL) 25 mg 24 hr tablet; Take 1 tablet (25 mg total) by mouth  daily    losartan (COZAAR) 25 mg tablet; Take 1 tablet (25 mg total) by mouth daily    History of gout    Orders:    allopurinol (ZYLOPRIM) 300 mg tablet; Take 1 tablet (300 mg total) by mouth daily    Mixed hyperlipidemia    Orders:    rosuvastatin (CRESTOR) 20 MG tablet; Take 1 tablet (20 mg total) by mouth daily    Paroxysmal atrial fibrillation (HCC)    Orders:    POCT ECG    Welcome to Medicare preventive visit    Orders:    POCT ECG      Depression Screening and Follow-up Plan: Patient was screened for depression during today's encounter. They screened negative with a PHQ-2 score of 0.        Preventive health issues were discussed with patient, and age appropriate screening tests were ordered as noted in patient's After Visit Summary. Personalized health advice and appropriate referrals for health education or preventive services given if needed, as noted in patient's After Visit Summary.    History of Present Illness     Patient here for welcome to Medicare wellness and review of chronic medical problems and  the labs and imaging if it is applicable.  Currently has no specific complaints other than mentioned in the review of systems  Denies chest pain, SOB, cough, abdominal pain, nausea, vomiting, fever, chills, lightheadedness, dizziness,headache, tingling or numbness.No bowel or bladder problem.         Patient Care Team:  Cheli Thakkar MD as PCP - General (Internal Medicine)  Cheli Thakkar MD as PCP - PCP-[facility] ([facility])    Review of Systems   Constitutional:  Negative for chills, fatigue and fever.   HENT:  Negative for congestion, ear pain, rhinorrhea, sneezing and sore throat.    Eyes:  Positive for visual disturbance. Negative for pain, discharge, redness and itching.   Respiratory:  Negative for cough, chest tightness and shortness of breath.    Cardiovascular:  Negative for chest pain, palpitations and leg swelling.   Gastrointestinal:  Negative for abdominal pain, blood in stool,  diarrhea, nausea and vomiting.   Endocrine: Negative for cold intolerance, heat intolerance, polydipsia, polyphagia and polyuria.   Genitourinary:  Negative for dysuria, frequency, hematuria and urgency.   Musculoskeletal:  Negative for arthralgias, back pain and myalgias.   Skin:  Positive for wound (Laceration with stitches left forehead above the eyebrow). Negative for color change and rash.   Neurological:  Negative for dizziness, weakness, light-headedness, numbness and headaches.   Hematological:  Does not bruise/bleed easily.   Psychiatric/Behavioral:  Negative for agitation, behavioral problems and confusion.      Medical History Reviewed by provider this encounter:  Tobacco  Allergies  Meds  Problems  Med Hx  Surg Hx  Fam Hx       Annual Wellness Visit Questionnaire   Lv is here for his Welcome to Medicare visit.     Health Risk Assessment:   Patient rates overall health as very good. Patient feels that their physical health rating is same. Eyesight was rated as same. Hearing was rated as same. Patient feels that their emotional and mental health rating is same. Patients states they are never, rarely angry. Patient states they are often unusually tired/fatigued. Pain experienced in the last 7 days has been none. Patient states that he has experienced no weight loss or gain in last 6 months.     Depression Screening:   PHQ-2 Score: 0      Fall Risk Screening:   In the past year, patient has experienced: no history of falling in past year      Home Safety:  Patient does not have trouble with stairs inside or outside of their home. Patient has working smoke alarms and has working carbon monoxide detector. Home safety hazards include: none.     Nutrition:   Current diet is Regular.     Medications:   Patient is currently taking over-the-counter supplements. OTC medications include: see medication list. Patient is able to manage medications.     Activities of Daily Living (ADLs)/Instrumental  Activities of Daily Living (IADLs):   Walk and transfer into and out of bed and chair?: Yes  Dress and groom yourself?: Yes    Bathe or shower yourself?: Yes    Feed yourself? Yes  Do your laundry/housekeeping?: Yes  Manage your money, pay your bills and track your expenses?: Yes  Make your own meals?: Yes    Do your own shopping?: Yes    Previous Hospitalizations:   Any hospitalizations or ED visits within the last 12 months?: No      Advance Care Planning:   Living will: No    Durable POA for healthcare: No    Advanced directive: No      Cognitive Screening:   Provider or family/friend/caregiver concerned regarding cognition?: No    Preventive Screenings      Cardiovascular Screening:    General: Screening Not Indicated and History Lipid Disorder      Diabetes Screening:     General: Screening Not Indicated and History Diabetes      Colorectal Cancer Screening:     General: Screening Current      Abdominal Aortic Aneurysm (AAA) Screening:    Risk factors include: age between 65-74 yo        Lung Cancer Screening:     General: Screening Not Indicated      Hepatitis C Screening:    General: Screening Current    Immunizations:  - Immunizations due: Prevnar 20, Tdap and Zoster (Shingrix)    Screening, Brief Intervention, and Referral to Treatment (SBIRT)     Screening  Typical number of drinks in a day: 0  Typical number of drinks in a week: 1  Interpretation: Low risk drinking behavior.    Single Item Drug Screening:  How often have you used an illegal drug (including marijuana) or a prescription medication for non-medical reasons in the past year? never    Single Item Drug Screen Score: 0  Interpretation: Negative screen for possible drug use disorder    Social Drivers of Health     Food Insecurity: No Food Insecurity (5/28/2025)    Nursing - Inadequate Food Risk Classification     Worried About Running Out of Food in the Last Year: Never true     Ran Out of Food in the Last Year: Never true   Transportation Needs:  "No Transportation Needs (5/28/2025)    PRAPARE - Transportation     Lack of Transportation (Medical): No     Lack of Transportation (Non-Medical): No   Housing Stability: Low Risk  (5/28/2025)    Housing Stability Vital Sign     Unable to Pay for Housing in the Last Year: No     Number of Times Moved in the Last Year: 0     Homeless in the Last Year: No   Utilities: Not At Risk (5/28/2025)    [facility] Utilities     Threatened with loss of utilities: No     Vision Screening (Inadequate exam)    Right eye Left eye Both eyes   Without correction      With correction 20/20 20/40 20/20       Objective   /69 (BP Location: Right arm, Patient Position: Sitting, Cuff Size: Standard)   Pulse 60   Temp 97.9 °F (36.6 °C) (Temporal)   Resp 18   Ht 6' 2\" (1.88 m)   Wt 93.4 kg (206 lb)   SpO2 98%   BMI 26.45 kg/m²     Physical Exam  Vitals and nursing note reviewed.   Constitutional:       General: He is not in acute distress.     Appearance: Normal appearance. He is well-developed and normal weight. He is not ill-appearing, toxic-appearing or diaphoretic.   HENT:      Head: Normocephalic and atraumatic.      Right Ear: Tympanic membrane, ear canal and external ear normal. There is no impacted cerumen.      Left Ear: Tympanic membrane, ear canal and external ear normal. There is no impacted cerumen.      Nose: Nose normal. No congestion or rhinorrhea.      Mouth/Throat:      Mouth: Mucous membranes are moist.      Pharynx: Oropharynx is clear. No oropharyngeal exudate or posterior oropharyngeal erythema.     Eyes:      General: No scleral icterus.        Right eye: No discharge.         Left eye: No discharge.      Extraocular Movements: Extraocular movements intact.      Conjunctiva/sclera: Conjunctivae normal.      Pupils: Pupils are equal, round, and reactive to light.       Cardiovascular:      Rate and Rhythm: Normal rate and regular rhythm.      Pulses: no weak pulses.           Dorsalis pedis pulses are 1+ on the " right side and 1+ on the left side.        Posterior tibial pulses are 1+ on the right side and 1+ on the left side.      Heart sounds: Normal heart sounds. No murmur heard.     No gallop.   Pulmonary:      Effort: Pulmonary effort is normal. No respiratory distress.      Breath sounds: Normal breath sounds. No wheezing or rales.   Abdominal:      General: Abdomen is flat. Bowel sounds are normal. There is no distension.      Palpations: Abdomen is soft.      Tenderness: There is no abdominal tenderness. There is no right CVA tenderness, left CVA tenderness or guarding.     Musculoskeletal:         General: No swelling or tenderness. Normal range of motion.      Cervical back: Normal range of motion and neck supple. No rigidity.      Right lower leg: No edema.      Left lower leg: No edema.   Feet:      Right foot:      Skin integrity: Callus and dry skin present. No ulcer, skin breakdown, erythema or warmth.      Left foot:      Skin integrity: Callus and dry skin present. No ulcer, skin breakdown, erythema or warmth.   Lymphadenopathy:      Cervical: No cervical adenopathy.     Skin:     General: Skin is warm and dry.      Capillary Refill: Capillary refill takes 2 to 3 seconds.      Coloration: Skin is not jaundiced or pale.     Neurological:      General: No focal deficit present.      Mental Status: He is alert and oriented to person, place, and time. Mental status is at baseline.      Sensory: No sensory deficit.      Motor: No weakness.     Psychiatric:         Mood and Affect: Mood normal.         Behavior: Behavior normal.

## 2025-05-28 NOTE — ASSESSMENT & PLAN NOTE
Lab Results   Component Value Date    HGBA1C 12.5 09/13/2024       Orders:    metFORMIN (GLUCOPHAGE) 1000 MG tablet; Take 1 tablet (1,000 mg total) by mouth 2 (two) times a day with meals

## 2025-05-28 NOTE — ASSESSMENT & PLAN NOTE
Orders:    metoprolol succinate (TOPROL-XL) 25 mg 24 hr tablet; Take 1 tablet (25 mg total) by mouth daily    losartan (COZAAR) 25 mg tablet; Take 1 tablet (25 mg total) by mouth daily

## 2025-05-28 NOTE — ASSESSMENT & PLAN NOTE
Orders:    levothyroxine 25 mcg tablet; Take 1 tablet (25 mcg total) by mouth daily    TSH, 3rd generation with Free T4 reflex; Future

## 2025-05-28 NOTE — TELEPHONE ENCOUNTER
Forms were filled for jardiance assistance patient may need a e-script to the pharmacy. Please advice.

## 2025-07-09 ENCOUNTER — TELEPHONE (OUTPATIENT)
Age: 65
End: 2025-07-09

## 2025-07-09 NOTE — TELEPHONE ENCOUNTER
Patient's wife called in for a refill on her 's Allopurinol. I let her know he should have a refill on that yet for another 90 tabs.I confirmed the pharmacy with her. She is going to contact the pharmacy for the refill.